# Patient Record
Sex: FEMALE | Race: WHITE | NOT HISPANIC OR LATINO | Employment: OTHER | URBAN - METROPOLITAN AREA
[De-identification: names, ages, dates, MRNs, and addresses within clinical notes are randomized per-mention and may not be internally consistent; named-entity substitution may affect disease eponyms.]

---

## 2017-01-23 ENCOUNTER — TRANSFERRED RECORDS (OUTPATIENT)
Dept: FAMILY MEDICINE | Facility: CLINIC | Age: 67
End: 2017-01-23

## 2017-11-24 ENCOUNTER — TELEPHONE (OUTPATIENT)
Dept: FAMILY MEDICINE | Facility: CLINIC | Age: 67
End: 2017-11-24

## 2017-11-24 ENCOUNTER — OFFICE VISIT (OUTPATIENT)
Dept: FAMILY MEDICINE | Facility: CLINIC | Age: 67
End: 2017-11-24

## 2017-11-24 VITALS
HEIGHT: 67 IN | BODY MASS INDEX: 19.74 KG/M2 | RESPIRATION RATE: 20 BRPM | SYSTOLIC BLOOD PRESSURE: 136 MMHG | HEART RATE: 76 BPM | WEIGHT: 125.8 LBS | TEMPERATURE: 98.3 F | DIASTOLIC BLOOD PRESSURE: 70 MMHG

## 2017-11-24 DIAGNOSIS — Z23 NEED FOR VACCINATION: ICD-10-CM

## 2017-11-24 DIAGNOSIS — C73 THYROID CANCER (H): ICD-10-CM

## 2017-11-24 DIAGNOSIS — E89.0 POSTSURGICAL HYPOTHYROIDISM: ICD-10-CM

## 2017-11-24 DIAGNOSIS — Z00.00 ROUTINE HISTORY AND PHYSICAL EXAMINATION OF ADULT: Primary | ICD-10-CM

## 2017-11-24 DIAGNOSIS — M81.0 OSTEOPOROSIS WITHOUT CURRENT PATHOLOGICAL FRACTURE, UNSPECIFIED OSTEOPOROSIS TYPE: ICD-10-CM

## 2017-11-24 PROCEDURE — 80048 BASIC METABOLIC PNL TOTAL CA: CPT | Mod: 90 | Performed by: FAMILY MEDICINE

## 2017-11-24 PROCEDURE — 90471 IMMUNIZATION ADMIN: CPT | Performed by: FAMILY MEDICINE

## 2017-11-24 PROCEDURE — 99397 PER PM REEVAL EST PAT 65+ YR: CPT | Mod: 25 | Performed by: FAMILY MEDICINE

## 2017-11-24 PROCEDURE — 86803 HEPATITIS C AB TEST: CPT | Mod: 90 | Performed by: FAMILY MEDICINE

## 2017-11-24 PROCEDURE — 90670 PCV13 VACCINE IM: CPT | Performed by: FAMILY MEDICINE

## 2017-11-24 PROCEDURE — 36415 COLL VENOUS BLD VENIPUNCTURE: CPT | Performed by: FAMILY MEDICINE

## 2017-11-24 PROCEDURE — 82306 VITAMIN D 25 HYDROXY: CPT | Mod: 90 | Performed by: FAMILY MEDICINE

## 2017-11-24 RX ORDER — CALCIUM CARBONATE 500 MG/1
2 TABLET, CHEWABLE ORAL DAILY
Qty: 150 TABLET | COMMUNITY
Start: 2017-11-24 | End: 2019-02-27

## 2017-11-24 RX ORDER — LEVOTHYROXINE SODIUM 125 UG/1
125 TABLET ORAL DAILY
Refills: 1 | COMMUNITY
Start: 2017-11-10 | End: 2019-02-27

## 2017-11-24 NOTE — PROGRESS NOTES
Chief Complaint: Olga Hanley is an 67 year old woman who presents for preventive health visit.      Besides routine health maintenance,  she would like to discuss    Complete thyroidectomy.: diagnosed with minimally invasive follicular thyroid cancer: endocrinology monitoring    Health Care Maintenance   Mammogram 1/2017  dexa 3/2016 Femoral neck -1.9  Spine -3.0  Dr Pereyra manages her osteoporosis  Repeat 3/2018  (our machine will be retired- encouraged repeat this month)  Pap 2016: up to date  mammogram 1/2017    Working at [employer] in [place]- part time  Retired from food preparation at [place]    Mother moved to senior apartment- has been busy helping with this transition    She cut back on dose- on Vitamin D  Just taking one tablet daily- 400 IU daily    Healthy Habits:  Do you get at least three servings of calcium containing foods daily (dairy, green leafy vegetables, etc.)? Yes (yogurt daily- also eats almonds, beans, vegetables- diet reviewed)  Outside of work or daily activities, how many days per week do you exercise for 30 minutes or longer? No regular exercise program- not sedentary  Encouraged her to stay as active as possible-  Have you had an eye exam in the past two years? yes  Do you see a dentist twice per year? yes    PHQ-2  Over the last two weeks- Have you been bothered by little interest or pleasure in doing things?  No  Over the last two weeks- Have you been feeling down, depressed, or hopeless?  No      Advanced directive: encouraged    Social History   Substance Use Topics     Smoking status: Former Smoker     Smokeless tobacco: Not on file      Comment: smoking in 20s only     Alcohol use 1.8 oz/week     3 Standard drinks or equivalent per week       Reviewed orders with patient.  Reviewed health maintenance and updated orders accordingly - Yes      History of abnormal Pap smear: NO - age 67 - see link Cervical Cytology Screening Guidelines    All Histories reviewed and updated in  "Epic.      ROS:   C: NEGATIVE for fever, chills, change in weight  I: NEGATIVE for worrisome rashes, moles or lesions  E: NEGATIVE for vision changes or irritation  ENT: NEGATIVE for ear, mouth and throat problems  R: NEGATIVE for significant cough or SOB  B: NEGATIVE for masses, tenderness or discharge  CV: NEGATIVE for chest pain, palpitations or peripheral edema  GI: NEGATIVE for nausea, abdominal pain, heartburn, or change in bowel habits  : NEGATIVE for unusual urinary or vaginal symptoms. No vaginal bleeding.  M: NEGATIVE for significant arthralgias or myalgia  N: NEGATIVE for weakness, dizziness or paresthesias  P: NEGATIVE for changes in mood or affect       OBJECTIVE:/70 (BP Location: Left arm, Patient Position: Chair, Cuff Size: Adult Regular)  Pulse 76  Temp 98.3  F (36.8  C) (Oral)  Resp 20  Ht 1.689 m (5' 6.5\")  Wt 57.1 kg (125 lb 12.8 oz)  Breastfeeding? No  BMI 20 kg/m2  General appearance: Healthy    Skin: Normal. No atypical appearing moles on inspection of trunk and extremities.    External ears  and canals clear bilaterally. TM's normal bilaterally. Nose normal without lesions or discharge. Oropharynx normal. Neck supple without palpable adenopathy.    Breasts are symmetric.  No dominant, discrete, fixed  or suspicious masses are noted.  No skin or nipple changes or axillary nodes.      Regular rate and  rhythm. S1 and S2 normal, no murmurs, clicks, gallops or rubs. No edema or JVD. Chest is clear; no wheezes or rales.    The abdomen is soft without tenderness, guarding, mass or organomegaly. Bowel sounds are normal. No CVA tenderness or inguinal adenopathy noted.    Pelvic: deferred    Rectal exam: deferred    Extremities: negative.      COUNSELING:  Reviewed preventive health counseling, as reflected in patient instructions       Regular exercise/ encouraged walking       Healthy diet/nutrition       Osteoporosis Prevention/Bone Health       Advance Care Planning      ATP III " Guidelines  ICSI Preventive Guidelines    ASSESSMENT/PLAN:  (Z00.00) Routine history and physical examination of adult  (primary encounter diagnosis)  Comment:   Plan: Hepatits C antibody (QUEST), VENOUS COLLECTION,        Vitamin D Deficiency, BASIC METABOLIC PANEL         (QUEST)            (M81.0) Osteoporosis without current pathological fracture, unspecified osteoporosis type  Comment:   Plan: VENOUS COLLECTION, Vitamin D Deficiency, Dexa         hip/pelvis/spine*, BASIC METABOLIC PANEL         (QUEST), calcium carbonate (TUMS) 500 MG         chewable tablet            (Z23) Need for vaccination  Comment:   Plan: VACCINE ADMINISTRATION, INITIAL, PNEUMOCOCCAL         CONJ VACCINE 13 VALENT IM            (E89.0) Postsurgical hypothyroidism  Comment:   Plan: levothyroxine (SYNTHROID/LEVOTHROID) 125 MCG         tablet            (C73) Thyroid cancer (H)  Comment:   Plan: levothyroxine (SYNTHROID/LEVOTHROID) 125 MCG         tablet

## 2017-11-24 NOTE — MR AVS SNAPSHOT
After Visit Summary   11/24/2017    Olga Hanley    MRN: [id number]           Patient Information     Date Of Birth          1950        Visit Information        Provider Department      11/24/2017 11:00 AM Evelyn Kilgore MD [facility]        Today's Diagnoses     Routine history and physical examination of adult    -  1    Osteoporosis without current pathological fracture, unspecified osteoporosis type        Need for vaccination        Postsurgical hypothyroidism        Thyroid cancer (H)           Follow-ups after your visit        Your next 10 appointments already scheduled     Dec 13, 2017  9:00 AM CST   Lab Appointment with [facility]   [facility] ([facility])    [address]  [address]  [facility] [id number] [phone]            Dec 13, 2017  9:30 AM CST   Office Visit with Evelyn Kilgore MD   [facility] ([facility])    [address]  [address]  [facility] [id number] [phone]              Who to contact     If you have questions or need follow up information about today's clinic visit or your schedule please contact [facility] directly at [phone].  Normal or non-critical lab and imaging results will be communicated to you by spigithart, letter or phone within 4 business days after the clinic has received the results. If you do not hear from us within 7 days, please contact the clinic through spigithart or phone. If you have a critical or abnormal lab result, we will notify you by phone as soon as possible.  Submit refill requests through [facility] or call your pharmacy and they will forward the refill request to us. Please allow 3 business days for your refill to be completed.          Additional Information About Your Visit        [facility] Information     [facility] lets you send messages to your doctor, view your  "test results, renew your prescriptions, schedule appointments and more. To sign up, go to [email or web address] . Click on \"Log in\" on the left side of the screen, which will take you to the Welcome page. Then click on \"Sign up Now\" on the right side of the page.     You will be asked to enter the access code listed below, as well as some personal information. Please follow the directions to create your username and password.     Your access code is: [email or web address]  Expires: 2018 11:07 AM     Your access code will  in 90 days. If you need help or a new code, please call your [facility] clinic or [phone].        Care EveryWhere ID     This is your Care EveryWhere ID. This could be used by other organizations to access your [facility] medical records  [id number]        Your Vitals Were     Pulse Temperature Respirations Height Last Period Breastfeeding?    76 98.3  F (36.8  C) (Oral) 20 1.689 m (5' 6.5\") (LMP Unknown) No    BMI (Body Mass Index)                   20 kg/m2            Blood Pressure from Last 3 Encounters:   17 136/70   16 124/68   16 102/64    Weight from Last 3 Encounters:   17 57.1 kg (125 lb 12.8 oz)   16 53.1 kg (117 lb)   16 56.7 kg (125 lb)              We Performed the Following     BASIC METABOLIC PANEL (QUEST)     Hepatits C antibody (QUEST)     PNEUMOCOCCAL CONJ VACCINE 13 VALENT IM     VACCINE ADMINISTRATION, INITIAL     VENOUS COLLECTION     Vitamin D Deficiency          Today's Medication Changes          These changes are accurate as of: 17 11:59 PM.  If you have any questions, ask your nurse or doctor.               These medicines have changed or have updated prescriptions.        Dose/Directions    levothyroxine 125 MCG tablet   Commonly known as:  SYNTHROID/LEVOTHROID   This may have changed:  Another medication with the same name was removed. Continue taking this medication, and follow the directions you see here.   Used for:  " Postsurgical hypothyroidism, Thyroid cancer (H)   Changed by:  Evelyn Kilgore MD        Dose:  125 mcg   Take 125 mcg by mouth daily   Refills:  1                Primary Care Provider Office Phone # Fax #    Evelyn Kilgore [phone]       [address] [facility] [address]  [facility] [address]        Equal Access to Services     [facility]: Hadii aad ku hadasho Soomaali, waaxda luqadaha, qaybta kaalmada adeegyada, waxay idiin hayaan adeeg khasiyash laBrandonaan . So [facility] [phone].    ATENCIÓN: Si habla español, tiene a ware disposición servicios gratuitos de asistencia lingüística. Llame al [phone].    We comply with applicable federal civil rights laws and [place] laws. We do not discriminate on the basis of race, color, national origin, age, disability, sex, sexual orientation, or gender identity.            Thank you!     Thank you for choosing [facility]  for your care. Our goal is always to provide you with excellent care. Hearing back from our patients is one way we can continue to improve our services. Please take a few minutes to complete the written survey that you may receive in the mail after your visit with us. Thank you!             Your Updated Medication List - Protect others around you: Learn how to safely use, store and throw away your medicines at www.disposemymeds.org.          This list is accurate as of: 11/24/17 11:59 PM.  Always use your most recent med list.                   Brand Name Dispense Instructions for use Diagnosis    ALENDRONATE SODIUM PO      Take 70 mg by mouth once a week        calcium carbonate 500 MG chewable tablet    TUMS    150 tablet    Take 2 tablets (1,000 mg) by mouth daily    Osteoporosis without current pathological fracture, unspecified osteoporosis type       levothyroxine 125 MCG tablet    SYNTHROID/LEVOTHROID     Take 125 mcg by mouth daily    Postsurgical hypothyroidism, Thyroid cancer (H)

## 2017-11-24 NOTE — TELEPHONE ENCOUNTER
Patient called and left a msg that she was suppose to call [facility] back with the amount of Vit D she is taking - 400IU qd    [phone] (home)

## 2017-11-24 NOTE — NURSING NOTE
Olga Hanley is here for a CPX.    Pre-visit planning  Immunizations -up to date  Colonoscopy -is due and to be scheduled by patient for later completion  Mammogram -is up to date  Asthma test --  PHQ9 -  ELOY 7 -  Questioned patient about current smoking habits.  Pt. quit smoking some time ago.  Body mass index is 20 kg/(m^2).  PULSE regular  My Chart:   CLASSIFICATION OF OVERWEIGHT AND OBESITY BY BMI                        Obesity Class           BMI(kg/m2)  Underweight                                    < 18.5  Normal                                         18.5-24.9  Overweight                                     25.0-29.9  OBESITY                     I                  30.0-34.9                             II                 35.0-39.9  EXTREME OBESITY             III                >40                            Patient's  BMI Body mass index is 20 kg/(m^2).  Http://hin.nhlbi.nih.gov/menuplanner/menu.cgi  ETOH screening:  Questions:  1-How often do you have a drink containing alcohol?                             2 times per week(s)  2-How many drinks containing alcohol do you have on a typical day when you are         Drinking?                              2   3- How often do you have 5 or more drinks on one occasion?                              never per     Have you ever:  None of the patient's responses to the CAGE screening were positive / Negative CAGE score

## 2017-11-24 NOTE — LETTER
December 3, 2017      Olga Hanley  [address] [place] [address]   [facility] [address]        Dear MsNaliniTalon,    We are writing to inform you of your test results.    Normal (negative) Hepatitis C screen    Your vitamin d level was Normal. The recommended daily dose of Vitamin D3 is 1000 IU .    Your recent basic profile was Normal including blood sugar, potassium and kidney function tests      Resulted Orders   Hepatits C antibody (QUEST)   Result Value Ref Range    HCV Antibody NON-REACTIVE NON-REACTIVE    SIGNAL TO CUT OFF - QUEST 0.01 <1.00   Vitamin D Deficiency   Result Value Ref Range    Vitamin D 25-OH Total 43 30 - 100 ng/mL      Comment:      Vitamin D Status         25-OH Vitamin D:     Deficiency:                    <20 ng/mL  Insufficiency:             20 - 29 ng/mL  Optimal:                 > or = 30 ng/mL     For 25-OH Vitamin D testing on patients on   D2-supplementation and patients for whom quantitation   of D2 and D3 fractions is required, the QuestAssureD(TM)  25-OH VIT D, (D2,D3), LC/MS/MS is recommended: order   code 19126 (patients >2yrs).     For more information on this test, go to:  http://education.[employer]/faq/RZX296  (This link is being provided for   informational/educational purposes only.)     BASIC METABOLIC PANEL (QUEST)   Result Value Ref Range    Glucose 86 65 - 99 mg/dL      Comment:                    Fasting reference interval         Urea Nitrogen 11 7 - 25 mg/dL    Creatinine 0.66 0.50 - 0.99 mg/dL      Comment:      For patients >49 years of age, the reference limit  for Creatinine is approximately 13% higher for people  identified as -American.         GFR Estimate 93 > OR = 60 mL/min/1.73m2    EGFR African American 107 > OR = 60 mL/min/1.73m2    BUN/Creatinine Ratio NOT APPLICABLE 6 - 22 (calc)    Sodium 139 135 - 146 mmol/L    Potassium 4.5 3.5 - 5.3 mmol/L    Chloride 100 98 - 110 mmol/L    Carbon Dioxide 20 20 - 31 mmol/L    Calcium 10.2 8.6 - 10.4  mg/dL       If you have any questions or concerns, please call the clinic at the number listed above.       Sincerely,        Evelyn Kilgore MD

## 2017-11-25 LAB
BUN SERPL-MCNC: 11 MG/DL (ref 7–25)
BUN/CREATININE RATIO: NORMAL (CALC) (ref 6–22)
CALCIUM SERPL-MCNC: 10.2 MG/DL (ref 8.6–10.4)
CHLORIDE SERPLBLD-SCNC: 100 MMOL/L (ref 98–110)
CO2 SERPL-SCNC: 20 MMOL/L (ref 20–31)
CREAT SERPL-MCNC: 0.66 MG/DL (ref 0.5–0.99)
EGFR AFRICAN AMERICAN - QUEST: 107 ML/MIN/1.73M2
GFR SERPL CREATININE-BSD FRML MDRD: 93 ML/MIN/1.73M2
GLUCOSE - QUEST: 86 MG/DL (ref 65–99)
HCV AB - QUEST: NORMAL
POTASSIUM SERPL-SCNC: 4.5 MMOL/L (ref 3.5–5.3)
SIGNAL TO CUT OFF - QUEST: 0.01
SODIUM SERPL-SCNC: 139 MMOL/L (ref 135–146)
VITAMIN D, 25-OH, TOTAL - QUEST: 43 NG/ML (ref 30–100)

## 2017-12-13 ENCOUNTER — TELEPHONE (OUTPATIENT)
Dept: FAMILY MEDICINE | Facility: CLINIC | Age: 67
End: 2017-12-13

## 2017-12-13 DIAGNOSIS — M81.0 OSTEOPOROSIS WITHOUT CURRENT PATHOLOGICAL FRACTURE, UNSPECIFIED OSTEOPOROSIS TYPE: Primary | ICD-10-CM

## 2017-12-13 NOTE — TELEPHONE ENCOUNTER
Pt called to cancel her Dexa today. She is ill.  Pt aware that you are gone next week.  Pt states that she will go to [facility] to have the Dexa done.   Will you please put in a order to be sent to [facility].  Pt states that she will follow up with you after she had is dexa  Pt also wishes you francisca Wilson.  Theresa  [phone] (home)   [phone] (work)

## 2018-01-18 ENCOUNTER — OFFICE VISIT (OUTPATIENT)
Dept: FAMILY MEDICINE | Facility: CLINIC | Age: 68
End: 2018-01-18

## 2018-01-18 VITALS
WEIGHT: 126.8 LBS | DIASTOLIC BLOOD PRESSURE: 78 MMHG | HEART RATE: 101 BPM | BODY MASS INDEX: 20.16 KG/M2 | TEMPERATURE: 98.4 F | OXYGEN SATURATION: 98 % | SYSTOLIC BLOOD PRESSURE: 126 MMHG

## 2018-01-18 DIAGNOSIS — H61.23 BILATERAL IMPACTED CERUMEN: Primary | ICD-10-CM

## 2018-01-18 DIAGNOSIS — M81.0 OSTEOPOROSIS WITHOUT CURRENT PATHOLOGICAL FRACTURE, UNSPECIFIED OSTEOPOROSIS TYPE: ICD-10-CM

## 2018-01-18 DIAGNOSIS — Z00.00 ROUTINE HISTORY AND PHYSICAL EXAMINATION OF ADULT: ICD-10-CM

## 2018-01-18 PROCEDURE — 99213 OFFICE O/P EST LOW 20 MIN: CPT | Performed by: PHYSICIAN ASSISTANT

## 2018-01-18 NOTE — NURSING NOTE
Olga is here for plugged ear on the right    Pre-visit Screening:  Immunizations:  up to date  Colonoscopy:  is up to date  Mammogram: is up to date  Asthma Action Test/Plan:  NA  PHQ9:  NA  GAD7:  NA  Questioned patient about current smoking habits Pt. has never smoked.  Ok to leave detailed message on voice mail for today's visit only Yes, phone # [phone]

## 2018-01-18 NOTE — MR AVS SNAPSHOT
"              After Visit Summary   2018    Olga Hanley    MRN: [id number]           Patient Information     Date Of Birth          1950        Visit Information        Provider Department      2018 1:45 PM Lauren Bose PA-C [facility]        Today's Diagnoses     Bilateral impacted cerumen    -  1       Follow-ups after your visit        Follow-up notes from your care team     Return if symptoms worsen or fail to improve.      Who to contact     If you have questions or need follow up information about today's clinic visit or your schedule please contact [facility] directly at [phone].  Normal or non-critical lab and imaging results will be communicated to you by MyChart, letter or phone within 4 business days after the clinic has received the results. If you do not hear from us within 7 days, please contact the clinic through MyChart or phone. If you have a critical or abnormal lab result, we will notify you by phone as soon as possible.  Submit refill requests through [facility] or call your pharmacy and they will forward the refill request to us. Please allow 3 business days for your refill to be completed.          Additional Information About Your Visit        MyChart Information     [facility] lets you send messages to your doctor, view your test results, renew your prescriptions, schedule appointments and more. To sign up, go to [email or web address] . Click on \"Log in\" on the left side of the screen, which will take you to the Welcome page. Then click on \"Sign up Now\" on the right side of the page.     You will be asked to enter the access code listed below, as well as some personal information. Please follow the directions to create your username and password.     Your access code is: [email or web address]  Expires: 2018 11:07 AM     Your access code will  in 90 days. If you need help or a new code, please call your [place] " [facility] or [phone].        Care EveryWhere ID     This is your Care EveryWhere ID. This could be used by other organizations to access your [facility] medical records  [id number]        Your Vitals Were     Pulse Temperature Last Period Pulse Oximetry BMI (Body Mass Index)       101 98.4  F (36.9  C) (Oral) (LMP Unknown) 98% 20.16 kg/m2        Blood Pressure from Last 3 Encounters:   01/18/18 126/78   11/24/17 136/70   09/21/16 124/68    Weight from Last 3 Encounters:   01/18/18 57.5 kg (126 lb 12.8 oz)   11/24/17 57.1 kg (125 lb 12.8 oz)   09/20/16 53.1 kg (117 lb)              We Performed the Following     Removal Impacted Cerumen Irrigation Unilateral (Ear Wash)        Primary Care Provider Office Phone # Fax #    Evelyn Kilgore -[phone] [address] [address]        Equal Access to Services     [facility] : Hadii nader ku hadasho Soomaali, waaxda luqadaha, qaybta kaalmada adeegyada, waxay eduardo hayarsalan rowe . So [facility] [phone].    ATENCIÓN: Si habla español, tiene a ware disposición servicios gratuitos de asistencia lingüística. Llame al [phone].    We comply with applicable federal civil rights laws and [place] laws. We do not discriminate on the basis of race, color, national origin, age, disability, sex, sexual orientation, or gender identity.            Thank you!     Thank you for choosing [facility]  for your care. Our goal is always to provide you with excellent care. Hearing back from our patients is one way we can continue to improve our services. Please take a few minutes to complete the written survey that you may receive in the mail after your visit with us. Thank you!             Your Updated Medication List - Protect others around you: Learn how to safely use, store and throw away your medicines at www.disposemymeds.org.          This list is accurate as of: 1/18/18 11:46 PM.  Always use  your most recent med list.                   Brand Name Dispense Instructions for use Diagnosis    ALENDRONATE SODIUM PO      Take 70 mg by mouth once a week        calcium carbonate 500 MG chewable tablet    TUMS    150 tablet    Take 2 tablets (1,000 mg) by mouth daily    Osteoporosis without current pathological fracture, unspecified osteoporosis type       levothyroxine 125 MCG tablet    SYNTHROID/LEVOTHROID     Take 125 mcg by mouth daily    Postsurgical hypothyroidism, Thyroid cancer (H)

## 2018-01-18 NOTE — PROGRESS NOTES
"CC: R ear plugged    History:  Olga is here as her R ear feels plugged for the past several weeks. L ear is completely fine. Today noticed her hearing was worse, where it hadn't been noticeable before. Has required ear flush in the past. Does not use Q-tips. No dizziness, vertigo.     Tried doing a peroxide wash recently, without success.      PMH, MEDICATIONS, ALLERGIES, SOCIAL AND FAMILY HISTORY in [facility] and reviewed by me personally.      ROS negative other than the symptoms noted above in the HPI.        Examination   /78 (BP Location: Right arm, Patient Position: Chair, Cuff Size: Adult Regular)  Pulse 101  Temp 98.4  F (36.9  C) (Oral)  Wt 57.5 kg (126 lb 12.8 oz)  LMP  (LMP Unknown)  SpO2 98%  BMI 20.16 kg/m2       Constitutional: Sitting comfortably, in no acute distress. Vital signs noted  Ears: external canals and TMs free of abnormalities  Cardiovascular:  regular rate and rhythm, no murmurs, clicks, or gallops  Respiratory:  normal respiratory rate and rhythm, lungs clear to auscultation  SKIN: No jaundice/pallor/rash.   Psychiatric: mentation appears normal and affect normal/bright        A/P    ICD-10-CM    1. Bilateral impacted cerumen H61.23 Removal Impacted Cerumen Irrigation Unilateral (Ear Wash)       DISCUSSION: Olga's cerumen was successfully removed, and pt felt instant relief of symptoms. Gave handout on \"do's and don'ts\" of wax removal. Recommended intermittent softening drops. Contact us with any concerns.     follow up visit: As needed    Lauren Bose PA-C  [facility]    "

## 2018-01-25 LAB — MAMMOGRAM: NORMAL

## 2018-02-12 ENCOUNTER — OFFICE VISIT (OUTPATIENT)
Dept: FAMILY MEDICINE | Facility: CLINIC | Age: 68
End: 2018-02-12

## 2018-02-12 VITALS
HEART RATE: 79 BPM | TEMPERATURE: 98 F | BODY MASS INDEX: 20.25 KG/M2 | SYSTOLIC BLOOD PRESSURE: 122 MMHG | WEIGHT: 129 LBS | DIASTOLIC BLOOD PRESSURE: 60 MMHG | HEIGHT: 67 IN | OXYGEN SATURATION: 99 %

## 2018-02-12 DIAGNOSIS — M81.0 OSTEOPOROSIS WITHOUT CURRENT PATHOLOGICAL FRACTURE, UNSPECIFIED OSTEOPOROSIS TYPE: Primary | ICD-10-CM

## 2018-02-12 PROCEDURE — 99213 OFFICE O/P EST LOW 20 MIN: CPT | Performed by: FAMILY MEDICINE

## 2018-02-12 NOTE — PROGRESS NOTES
SUBJECTIVE:   Olga Hanley is a 67 year old female who presents to clinic today for the following health issues:    The patient presents to discuss the results of her recent DEXA scan, please see below. She has a history of osteoporosis. She is currently taking alendronate once per week, starting one year ago. She does not experience any side effects of heartburn with this. She has also been taking a calcium with Vitamin D supplement, per her endocrinologist, but has had some problems with constipation. She also takes a daily vitamin D supplement.    DEXA SCAN results: 1/25/2018    The total T-score from L1 to L4 is -2.9.  The T-score for the right femoral neck is -1.8. The bone mineral density of the right femoral neck is 0.647 g/cm2.  The T-score for the left femoral neck is -2.2.    Comparison to 3/2016  Left femoral neck -1.9 (improved from -2.2)  Spine -3.0 - unchanged    Osteoporosis Risk Score/FRAX score:  http://www.shef.ac.uk/FRAX/  Risk of Hip Fracture 1.9(Significant if >3%)  Risk of Overall Fracture: 10 (Significant if >20%)    Problem list and histories reviewed & adjusted, as indicated.  Additional history: as documented    Patient Active Problem List   Diagnosis     ACP (advance care planning)     Health Care Home     Osteoporosis     Thyroid cancer (H)     Past Surgical History:   Procedure Laterality Date     C APPENDECTOMY  1998    incidental with hysterectomy     C NONSPECIFIC PROCEDURE  3/2003    cystocele, rectocele, repair Dr Escobar JOYA TOTAL ABDOM HYSTERECTOMY  1988    left oophorectomy; abnormal pap smears     CATARACT IOL, RT/LT  4/2011    left     COLONOSCOPY  2/9/2006    no immediate complication, diverticulitis-sigmoid/ Dr Sameer Pearl     THYROIDECTOMY Right 8/23/2016    Procedure: THYROIDECTOMY;  Surgeon: Prudence Broussard MD;  Location: [facility] OR     THYROIDECTOMY Left 9/20/2016    Procedure: THYROIDECTOMY;  Surgeon: Prudence Broussard MD;  Location:  OR       Social History  "  Substance Use Topics     Smoking status: Former Smoker     Smokeless tobacco: Never Used      Comment: smoking in 20s only     Alcohol use 1.8 oz/week     3 Standard drinks or equivalent per week     Family History   Problem Relation Age of Onset     Lipids Mother      diagnosed in her 70's     CANCER Maternal Aunt 70     breast cancer     Other - See Comments Brother      Turner  at 10         Current Outpatient Prescriptions   Medication Sig Dispense Refill     Alendronate Sodium 70 MG TBEF Take 70 mg by mouth once a week 12 tablet 3     levothyroxine (SYNTHROID/LEVOTHROID) 125 MCG tablet Take 125 mcg by mouth daily  1     calcium carbonate (TUMS) 500 MG chewable tablet Take 2 tablets (1,000 mg) by mouth daily 150 tablet      [DISCONTINUED] ALENDRONATE SODIUM PO Take 70 mg by mouth once a week         Reviewed and updated as needed this visit by clinical staff  Tobacco  Allergies  Meds  Problems       Reviewed and updated as needed this visit by Provider           OBJECTIVE:     /60 (BP Location: Right arm, Patient Position: Chair, Cuff Size: Adult Regular)  Pulse 79  Temp 98  F (36.7  C) (Oral)  Ht 1.689 m (5' 6.5\")  Wt 58.5 kg (129 lb)  LMP  (LMP Unknown)  SpO2 99%  Breastfeeding? No  BMI 20.51 kg/m2  Body mass index is 20.51 kg/(m^2).   Constitutional: Alert, oriented, well hydrated. Skin turgor normal.    Diagnostic Test Results:  none    ASSESSMENT/PLAN:     Problem List Items Addressed This Visit     Osteoporosis - Primary    Relevant Medications    Alendronate Sodium 70 MG TBEF         (M81.0) Osteoporosis without current pathological fracture, unspecified osteoporosis type  (primary encounter diagnosis)  Comment: Recent DEXA scan indicates stable osteoporosis. Will continue alendronate. Patient also advised to discontinue calcium supplement and replace this with calcium rich foods in her diet.- continue her Vitamin D supplement.   Plan: Alendronate Sodium 70 MG TBEF    Repeat " DEXA in 2 years    More than 50% of visit spent in counseling.              Scribe Statement: I, Gil Choudhury, PSS, am scribing for and in the presence of Evelyn Kilgore MD. 2/12/2018 1:38 PM    Provider Statement: I personally performed this service and the scribe documentation above accurately reflects this service. Evelyn Kilgore MD 2/12/2018 1:38 PM    Evelyn Kilgore MD  [facility]

## 2018-02-12 NOTE — MR AVS SNAPSHOT
"              After Visit Summary   2018    Olga Hanley    MRN: [id number]           Patient Information     Date Of Birth          1950        Visit Information        Provider Department      2018 2:30 PM Evelyn Kilgore MD [facility]        Care Instructions    Three dairy products a day   (other sources of calcium almonds, red and white beans, kale)    Take 1000 IU of vitamin D3    Stop calcium supplements          Follow-ups after your visit        Who to contact     If you have questions or need follow up information about today's clinic visit or your schedule please contact [facility] directly at [phone].  Normal or non-critical lab and imaging results will be communicated to you by MyChart, letter or phone within 4 business days after the clinic has received the results. If you do not hear from us within 7 days, please contact the clinic through MyChart or phone. If you have a critical or abnormal lab result, we will notify you by phone as soon as possible.  Submit refill requests through Dot or call your pharmacy and they will forward the refill request to us. Please allow 3 business days for your refill to be completed.          Additional Information About Your Visit        MyChart Information     Dot lets you send messages to your doctor, view your test results, renew your prescriptions, schedule appointments and more. To sign up, go to [email or web address] . Click on \"Log in\" on the left side of the screen, which will take you to the Welcome page. Then click on \"Sign up Now\" on the right side of the page.     You will be asked to enter the access code listed below, as well as some personal information. Please follow the directions to create your username and password.     Your access code is: [email or web address]  Expires: 2018 11:07 AM     Your access code will  in 90 days. If you need help or a new code, please " "call your [place] clinic or [phone].        Care EveryWhere ID     This is your Care EveryWhere ID. This could be used by other organizations to access your [place] medical records  [id number]        Your Vitals Were     Pulse Temperature Height Last Period Pulse Oximetry Breastfeeding?    79 98  F (36.7  C) (Oral) 1.689 m (5' 6.5\") (LMP Unknown) 99% No    BMI (Body Mass Index)                   20.51 kg/m2            Blood Pressure from Last 3 Encounters:   02/12/18 122/60   01/18/18 126/78   11/24/17 136/70    Weight from Last 3 Encounters:   02/12/18 58.5 kg (129 lb)   01/18/18 57.5 kg (126 lb 12.8 oz)   11/24/17 57.1 kg (125 lb 12.8 oz)              Today, you had the following     No orders found for display       Primary Care Provider Office Phone # Fax #    Evelyn Kilgore [phone]       [address] [address]        Equal Access to Services     [facility]: Hadii nader ku hadasho Soomaali, waaxda luqadaha, qaybta kaalmada adekeenan, sommer rowe . So [facility] [phone].    ATENCIÓN: Si habla español, tiene a ware disposición servicios gratuitos de asistencia lingüística. [facility] [phone].    We comply with applicable federal civil rights laws and [place] laws. We do not discriminate on the basis of race, color, national origin, age, disability, sex, sexual orientation, or gender identity.            Thank you!     Thank you for choosing [facility]  for your care. Our goal is always to provide you with excellent care. Hearing back from our patients is one way we can continue to improve our services. Please take a few minutes to complete the written survey that you may receive in the mail after your visit with us. Thank you!             Your Updated Medication List - Protect others around you: Learn how to safely use, store and throw away your medicines at www.disposemymeds.org.          This list " is accurate as of 2/12/18  2:38 PM.  Always use your most recent med list.                   Brand Name Dispense Instructions for use Diagnosis    ALENDRONATE SODIUM PO      Take 70 mg by mouth once a week        calcium carbonate 500 MG chewable tablet    TUMS    150 tablet    Take 2 tablets (1,000 mg) by mouth daily    Osteoporosis without current pathological fracture, unspecified osteoporosis type       levothyroxine 125 MCG tablet    SYNTHROID/LEVOTHROID     Take 125 mcg by mouth daily    Postsurgical hypothyroidism, Thyroid cancer (H)

## 2018-02-12 NOTE — NURSING NOTE
Olga is here for dexa results    Pre-Visit Screening :  Immunizations : up to date    Colonoscopy : is due and to be scheduled by patient for later completion  Mammogram : is up to date  Asthma Action Test/Plan : jada  PHQ9/GAD7 :  Na    Pulse - regular  My Chart - declines    CLASSIFICATION OF OVERWEIGHT AND OBESITY BY BMI                         Obesity Class           BMI(kg/m2)  Underweight                                    < 18.5  Normal                                         18.5-24.9  Overweight                                     25.0-29.9  OBESITY                     I                  30.0-34.9                              II                 35.0-39.9  EXTREME OBESITY             III                >40                             Patient's  BMI Body mass index is 22.15 kg/(m^2).  http://hin.nhlbi.nih.gov/menuplanner/menu.cgi  Questioned patient about current smoking habits.  Pt. has never smoked.  The patient has verbalized that it is ok to leave a detailed voice message on the patient's cell phone with results/recommendations from this visit.       Verified [phone] phone number:

## 2018-02-28 ENCOUNTER — HOSPITAL ENCOUNTER (OUTPATIENT)
Dept: ULTRASOUND IMAGING | Facility: CLINIC | Age: 68
Discharge: HOME | End: 2018-02-28
Attending: SPECIALIST | Admitting: SPECIALIST
Payer: COMMERCIAL

## 2018-02-28 DIAGNOSIS — Z85.850 HISTORY OF THYROID CANCER: ICD-10-CM

## 2018-02-28 PROCEDURE — 76536 US EXAM OF HEAD AND NECK: CPT

## 2018-10-15 ENCOUNTER — ALLIED HEALTH/NURSE VISIT (OUTPATIENT)
Dept: FAMILY MEDICINE | Facility: CLINIC | Age: 68
End: 2018-10-15

## 2018-10-15 DIAGNOSIS — Z23 NEED FOR VACCINATION: Primary | ICD-10-CM

## 2018-10-15 PROCEDURE — 90686 IIV4 VACC NO PRSV 0.5 ML IM: CPT | Performed by: FAMILY MEDICINE

## 2018-10-15 PROCEDURE — 90471 IMMUNIZATION ADMIN: CPT | Performed by: FAMILY MEDICINE

## 2018-10-15 PROCEDURE — 90714 TD VACC NO PRESV 7 YRS+ IM: CPT | Performed by: FAMILY MEDICINE

## 2018-10-15 PROCEDURE — 90472 IMMUNIZATION ADMIN EACH ADD: CPT | Performed by: FAMILY MEDICINE

## 2018-11-08 ENCOUNTER — OFFICE VISIT (OUTPATIENT)
Dept: FAMILY MEDICINE | Facility: CLINIC | Age: 68
End: 2018-11-08

## 2018-11-08 VITALS
SYSTOLIC BLOOD PRESSURE: 118 MMHG | WEIGHT: 139 LBS | OXYGEN SATURATION: 97 % | DIASTOLIC BLOOD PRESSURE: 70 MMHG | TEMPERATURE: 98.1 F | BODY MASS INDEX: 22.1 KG/M2 | HEART RATE: 93 BPM

## 2018-11-08 DIAGNOSIS — B02.9 HERPES ZOSTER WITHOUT COMPLICATION: Primary | ICD-10-CM

## 2018-11-08 PROCEDURE — 99213 OFFICE O/P EST LOW 20 MIN: CPT | Performed by: FAMILY MEDICINE

## 2018-11-08 RX ORDER — VALACYCLOVIR HYDROCHLORIDE 1 G/1
1000 TABLET, FILM COATED ORAL 3 TIMES DAILY
Qty: 21 TABLET | Refills: 0 | Status: SHIPPED | OUTPATIENT
Start: 2018-11-08 | End: 2019-02-27

## 2018-11-08 NOTE — MR AVS SNAPSHOT
After Visit Summary   11/8/2018    Olga Hanley    MRN: [id number]           Patient Information     Date Of Birth          1950        Visit Information        Provider Department      11/8/2018 11:30 AM Evelyn Kilgore MD [facility]        Today's Diagnoses     Herpes zoster without complication    -  1       Follow-ups after your visit        Who to contact     If you have questions or need follow up information about today's clinic visit or your schedule please contact [facility] directly at [phone].  Normal or non-critical lab and imaging results will be communicated to you by MyChart, letter or phone within 4 business days after the clinic has received the results. If you do not hear from us within 7 days, please contact the clinic through MyChart or phone. If you have a critical or abnormal lab result, we will notify you by phone as soon as possible.  Submit refill requests through WorldAPP or call your pharmacy and they will forward the refill request to us. Please allow 3 business days for your refill to be completed.          Additional Information About Your Visit        Care EveryWhere ID     This is your Care EveryWhere ID. This could be used by other organizations to access your [place] medical records  [id number]        Your Vitals Were     Pulse Temperature Last Period Pulse Oximetry BMI (Body Mass Index)       93 98.1  F (36.7  C) (Oral) (LMP Unknown) 97% 22.1 kg/m2        Blood Pressure from Last 3 Encounters:   11/08/18 118/70   02/12/18 122/60   01/18/18 126/78    Weight from Last 3 Encounters:   11/08/18 63 kg (139 lb)   02/12/18 58.5 kg (129 lb)   01/18/18 57.5 kg (126 lb 12.8 oz)              Today, you had the following     No orders found for display         Today's Medication Changes          These changes are accurate as of 11/8/18 11:59 PM.  If you have any questions, ask your nurse or doctor.                Start taking these medicines.        Dose/Directions    valACYclovir 1000 mg tablet   Commonly known as:  VALTREX   Used for:  Herpes zoster without complication   Started by:  Evelyn Kilgore MD        Dose:  1000 mg   Take 1 tablet (1,000 mg) by mouth 3 times daily   Quantity:  21 tablet   Refills:  0            Where to get your medicines      These medications were sent to [id number] - [place], [place] - [address] [facility] [address]  [address], [facility] [address]     Phone:  [phone]     valACYclovir 1000 mg tablet                Primary Care Provider Office Phone # Fax #    Evelyn Kilgore [phone] [address] [address]        Equal Access to Services     [facility] : Hadii nader au hadasho Soomaali, waaxda luqadaha, qaybta kaalmada adeegyada, sommer rowe . So [facility] [phone].    ATENCIÓN: Si habla español, tiene a ware disposición servicios gratuitos de asistencia lingüística. Llame al [phone].    We comply with applicable federal civil rights laws and [place] laws. We do not discriminate on the basis of race, color, national origin, age, disability, sex, sexual orientation, or gender identity.            Thank you!     Thank you for choosing [facility]  for your care. Our goal is always to provide you with excellent care. Hearing back from our patients is one way we can continue to improve our services. Please take a few minutes to complete the written survey that you may receive in the mail after your visit with us. Thank you!             Your Updated Medication List - Protect others around you: Learn how to safely use, store and throw away your medicines at www.disposemymeds.org.          This list is accurate as of 11/8/18 11:59 PM.  Always use your most recent med list.                   Brand Name Dispense Instructions for  use Diagnosis    Alendronate Sodium 70 MG Tbef     12 tablet    Take 70 mg by mouth once a week    Osteoporosis without current pathological fracture, unspecified osteoporosis type       calcium carbonate 500 MG chewable tablet    TUMS    150 tablet    Take 2 tablets (1,000 mg) by mouth daily    Osteoporosis without current pathological fracture, unspecified osteoporosis type       levothyroxine 125 MCG tablet    SYNTHROID/LEVOTHROID     Take 125 mcg by mouth daily    Postsurgical hypothyroidism, Thyroid cancer (H)       valACYclovir 1000 mg tablet    VALTREX    21 tablet    Take 1 tablet (1,000 mg) by mouth 3 times daily    Herpes zoster without complication

## 2018-11-08 NOTE — NURSING NOTE
Olga is here due to having a rash on her right leg that seems to be spreading.      Pre-visit Screening:  Immunizations:  up to date  Colonoscopy:  is due and to be scheduled by patient for later completion  Mammogram: is up to date  Asthma Action Test/Plan:  NA  PHQ9:  PHQ-2 done today   GAD7:  No concerns  Questioned patient about current smoking habits Pt. quit smoking some time ago.  Ok to leave detailed message on voice mail for today's visit only Yes, phone # [phone]

## 2018-11-08 NOTE — PROGRESS NOTES
SUBJECTIVE:  68 year old female presents with the following concern:  Two day history of rash on her right leg-   She first noticed the rash two days ago behind her right knee.  Now another cluster is more proximal , posterior upper leg.  She feels a soreness in her buttock, but no rash  She is having more stress- cares for her aging mother as well has her  who is recovering from a recent colon resection.      Patient Active Problem List   Diagnosis     ACP (advance care planning)     Health Care Home     Osteoporosis     Thyroid cancer (H)     Past Surgical History:   Procedure Laterality Date     C APPENDECTOMY  1998    incidental with hysterectomy     C NONSPECIFIC PROCEDURE  3/2003    cystocele, rectocele, repair Dr Escobar JOYA TOTAL ABDOM HYSTERECTOMY  1988    left oophorectomy; abnormal pap smears     CATARACT IOL, RT/LT  4/2011    left     COLONOSCOPY  2/9/2006    no immediate complication, diverticulitis-sigmoid/ Dr Sameer Pearl     THYROIDECTOMY Right 8/23/2016    Procedure: THYROIDECTOMY;  Surgeon: Prudence Broussard MD;  Location: [facility] OR     THYROIDECTOMY Left 9/20/2016    Procedure: THYROIDECTOMY;  Surgeon: Prudence Broussard MD;  Location: [facility] OR     Current Outpatient Prescriptions   Medication     Alendronate Sodium 70 MG TBEF     calcium carbonate (TUMS) 500 MG chewable tablet     levothyroxine (SYNTHROID/LEVOTHROID) 125 MCG tablet     valACYclovir (VALTREX) 1000 mg tablet     No current facility-administered medications for this visit.            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

## 2018-12-05 ENCOUNTER — ALLIED HEALTH/NURSE VISIT (OUTPATIENT)
Dept: FAMILY MEDICINE | Facility: CLINIC | Age: 68
End: 2018-12-05

## 2018-12-05 DIAGNOSIS — Z23 NEED FOR VACCINATION: Primary | ICD-10-CM

## 2018-12-05 PROCEDURE — 90471 IMMUNIZATION ADMIN: CPT | Performed by: FAMILY MEDICINE

## 2018-12-05 PROCEDURE — 90732 PPSV23 VACC 2 YRS+ SUBQ/IM: CPT | Performed by: FAMILY MEDICINE

## 2019-01-05 DIAGNOSIS — M81.0 OSTEOPOROSIS WITHOUT CURRENT PATHOLOGICAL FRACTURE, UNSPECIFIED OSTEOPOROSIS TYPE: ICD-10-CM

## 2019-01-05 RX ORDER — ALENDRONATE SODIUM 70 MG/1
TABLET, EFFERVESCENT ORAL
Qty: 12 TABLET | Refills: 0 | OUTPATIENT
Start: 2019-01-05

## 2019-01-05 NOTE — TELEPHONE ENCOUNTER
Received incoming refill request for   Pending Prescriptions:                       Disp   Refills    BINOSTO 70 MG TBEF [Pharmacy Med Name: BI*12 tab*0            Sig: DISSOLVE AND DRINK 1 TABLET BY MOUTH 1 TIME A           WEEK    This medication is not currently on patients medication list. She was last seen in clinic on 11/8/18 for herpes outbreak. Routing to Dr. Kilgore to clarify if patient should be taking this or not.

## 2019-01-08 DIAGNOSIS — M81.0 OSTEOPOROSIS WITHOUT CURRENT PATHOLOGICAL FRACTURE, UNSPECIFIED OSTEOPOROSIS TYPE: ICD-10-CM

## 2019-01-08 RX ORDER — ALENDRONATE SODIUM 70 MG/1
TABLET, EFFERVESCENT ORAL
Qty: 12 TABLET | OUTPATIENT
Start: 2019-01-08

## 2019-01-08 NOTE — TELEPHONE ENCOUNTER
Refused Prescriptions:                       Disp   Refills    BINOSTO 70 MG TBEF [Pharmacy Med Name: BIN*12 tab*         Sig: DISSOLVE AND DRINK 1 TABLET BY MOUTH 1 TIME A WEEK  Refused By: ANN LOCKHART  Reason for Refusal: Originating/Specialty Provider to approve  Reason for Refusal Comment: Dr Pereyra, [facility]    See RE on 1-5-2019  Ann  [phone] (home)

## 2019-01-10 DIAGNOSIS — M81.0 OSTEOPOROSIS WITHOUT CURRENT PATHOLOGICAL FRACTURE, UNSPECIFIED OSTEOPOROSIS TYPE: ICD-10-CM

## 2019-01-11 RX ORDER — ALENDRONATE SODIUM 70 MG/1
TABLET, EFFERVESCENT ORAL
Qty: 12 TABLET | Refills: 0 | OUTPATIENT
Start: 2019-01-11

## 2019-01-11 NOTE — TELEPHONE ENCOUNTER
Pending Prescriptions:                       Disp   Refills    BINOSTO 70 MG TBEF [Pharmacy Med Name: BI*12 tab*0            Sig: DISSOLVE AND DRINK 1 TABLET BY MOUTH 1 TIME A           WEEK    RENALDO waite review    I refused the last one due to I saw your RE on 1-5-2018  I did inform pharmacy that Endo is refilling  Please deny  Ann  [phone] (home)

## 2019-01-11 NOTE — TELEPHONE ENCOUNTER
Spoke with pt stated this has never been filled here and needs to be filled through her endo. Pt stated it was always filled here, but she will call endo to ask.

## 2019-02-11 ENCOUNTER — TRANSFERRED RECORDS (OUTPATIENT)
Dept: FAMILY MEDICINE | Facility: CLINIC | Age: 69
End: 2019-02-11

## 2019-02-27 ENCOUNTER — OFFICE VISIT (OUTPATIENT)
Dept: FAMILY MEDICINE | Facility: CLINIC | Age: 69
End: 2019-02-27

## 2019-02-27 VITALS
SYSTOLIC BLOOD PRESSURE: 118 MMHG | WEIGHT: 141.6 LBS | RESPIRATION RATE: 20 BRPM | TEMPERATURE: 98.3 F | HEART RATE: 76 BPM | DIASTOLIC BLOOD PRESSURE: 68 MMHG | HEIGHT: 66 IN | BODY MASS INDEX: 22.76 KG/M2

## 2019-02-27 DIAGNOSIS — H26.9 CATARACT OF RIGHT EYE, UNSPECIFIED CATARACT TYPE: ICD-10-CM

## 2019-02-27 DIAGNOSIS — Z01.818 PRE-OP EXAM: Primary | ICD-10-CM

## 2019-02-27 LAB
ERYTHROCYTE [DISTWIDTH] IN BLOOD BY AUTOMATED COUNT: 12.4 %
HCT VFR BLD AUTO: 42.8 % (ref 35–47)
HEMOGLOBIN: 14 G/DL (ref 11.7–15.7)
MCH RBC QN AUTO: 29.7 PG (ref 26–33)
MCHC RBC AUTO-ENTMCNC: 32.7 G/DL (ref 31–36)
MCV RBC AUTO: 90.6 FL (ref 78–100)
PLATELET COUNT - QUEST: 205 10^9/L (ref 150–375)
RBC # BLD AUTO: 4.72 10*12/L (ref 3.8–5.2)
WBC # BLD AUTO: 6.1 10*9/L (ref 4–11)

## 2019-02-27 PROCEDURE — 36415 COLL VENOUS BLD VENIPUNCTURE: CPT | Performed by: FAMILY MEDICINE

## 2019-02-27 PROCEDURE — 85027 COMPLETE CBC AUTOMATED: CPT | Performed by: FAMILY MEDICINE

## 2019-02-27 PROCEDURE — 99214 OFFICE O/P EST MOD 30 MIN: CPT | Performed by: FAMILY MEDICINE

## 2019-02-27 RX ORDER — LEVOTHYROXINE SODIUM 100 UG/1
100 TABLET ORAL DAILY
Qty: 30 TABLET | COMMUNITY
Start: 2019-02-27

## 2019-02-27 ASSESSMENT — MIFFLIN-ST. JEOR: SCORE: 1203.01

## 2019-02-27 NOTE — PROGRESS NOTES
[facility]  [address]  [address]  [facility] [address]  [phone]  Dept: [phone]    PRE-OP EVALUATION:  Today's date: 2019    Olga Hanley (: 1950) presents for pre-operative evaluation assessment as requested by Dr. oJnes.  She requires evaluation and anesthesia risk assessment prior to undergoing surgery/procedure for treatment of right eye cataract .    Proposed Surgery/ Procedure: Cataract  Date of Surgery/ Procedure: 3/6/19  Time of Surgery/ Procedure: 8am  Hospital/Surgical Facility: [facility]  Fax number for surgical facility:   Primary Physician: Evelyn Kilgore  Type of Anesthesia Anticipated: to be determined    Patient has a Health Care Directive or Living Will:  NO    1. NO - Do you have a history of heart attack, stroke, stent, bypass or surgery on an artery in the head, neck, heart or legs?  2. NO - Do you ever have any pain or discomfort in your chest?  3. NO - Do you have a history of  Heart Failure?  4. NO - Are you troubled by shortness of breath when: walking on the level, up a slight hill or at night?  5. NO - Do you currently have a cold, bronchitis or other respiratory infection?  6. NO - Do you have a cough, shortness of breath or wheezing?  7. NO - Do you sometimes get pains in the calves of your legs when you walk?  8. NO - Do you or anyone in your family have previous history of blood clots?  9. NO - Do you or does anyone in your family have a serious bleeding problem such as prolonged bleeding following surgeries or cuts?  10. NO - Have you ever had problems with anemia or been told to take iron pills?  11. NO - Have you had any abnormal blood loss such as black, tarry or bloody stools, or abnormal vaginal bleeding?  12. NO - Have you ever had a blood transfusion?  13. NO - Have you or any of your relatives ever had problems with anesthesia?  14. NO - Do you have sleep apnea, excessive snoring or daytime  drowsiness?  15. NO - Do you have any prosthetic heart valves?  16. NO - Do you have prosthetic joints?  17. NO - Is there any chance that you may be pregnant?      HPI:     HPI related to upcoming procedure:  Right cataract      MEDICAL HISTORY:     Patient Active Problem List    Diagnosis Date Noted     Thyroid cancer (H) 09/20/2016     Priority: Medium     Osteoporosis 09/19/2016     Priority: Medium     Health Care Home 02/18/2013     Priority: Low     State Tier Level:  0  Status:  N/a   Care Coordinator:  n/a  See Letters for [facility] Care Plan           ACP (advance care planning) 04/14/2011     Priority: Low     Advance Care Planning:   ACP Review and Resources Provided:  Reviewed chart for advance care plan.  Olga Hanley has no plan or code status on file. Discussed available resources and provided with information. Confirmed code status reflects current choices pending further ACP discussions.  Confirmed/documented designated decision maker(s). See permanent comments section of demographics in clinical tab.   Added by Anamika Bowie on 3/31/2014              Past Medical History:   Diagnosis Date     Cataract 4/14/2011     Utility update for deleted IMO code [employer] Update utility     Heart murmur 2003     Past Surgical History:   Procedure Laterality Date     C APPENDECTOMY  1998    incidental with hysterectomy     C NONSPECIFIC PROCEDURE  3/2003    cystocele, rectocele, repair Dr Escobar JOYA TOTAL ABDOM HYSTERECTOMY  1988    left oophorectomy; abnormal pap smears     CATARACT IOL, RT/LT  4/2011    left     COLONOSCOPY  2/9/2006    no immediate complication, diverticulitis-sigmoid/ Dr Sameer Pearl     THYROIDECTOMY Right 8/23/2016    Procedure: THYROIDECTOMY;  Surgeon: Prudence Broussard MD;  Location:  OR     THYROIDECTOMY Left 9/20/2016    Procedure: THYROIDECTOMY;  Surgeon: Prudence Broussard MD;  Location: [facility] OR     Current Outpatient Medications   Medication Sig Dispense Refill     Alendronate  "Sodium 70 MG TBEF Take 70 mg by mouth once a week 12 tablet 3     levothyroxine (SYNTHROID/LEVOTHROID) 100 MCG tablet Take 1 tablet (100 mcg) by mouth daily 30 tablet      vitamin D3 (CHOLECALCIFEROL) 1000 units (25 mcg) tablet Take by mouth daily 30 tablet      OTC products: no recent use of OTC ASA, NSAIDS or Steroids  Occasional acetaminophen    Allergies   Allergen Reactions     Sulfa Drugs      difficulting breathing      Latex Allergy: NO    Social History     Tobacco Use     Smoking status: Former Smoker     Smokeless tobacco: Never Used     Tobacco comment: smoking in 20s only   Substance Use Topics     Alcohol use: Yes     Alcohol/week: 1.8 oz     Types: 3 Standard drinks or equivalent per week     History   Drug Use No       REVIEW OF SYSTEMS:   CONSTITUTIONAL: NEGATIVE for fever, chills, change in weight  ENT/MOUTH: NEGATIVE for ear, mouth and throat problems  RESP: NEGATIVE for significant cough or SOB  CV: NEGATIVE for chest pain, palpitations or peripheral edema  GI: NEGATIVE for nausea, abdominal pain, heartburn, or change in bowel habits  MUSCULOSKELETAL: NEGATIVE for significant arthralgias or myalgia  PSYCHIATRIC: NEGATIVE for changes in mood or affect    EXAM:   /68 (BP Location: Left arm, Patient Position: Chair, Cuff Size: Adult Regular)   Pulse 76   Temp 98.3  F (36.8  C) (Oral)   Resp 20   Ht 1.683 m (5' 6.25\")   Wt 64.2 kg (141 lb 9.6 oz)   LMP  (LMP Unknown)   BMI 22.68 kg/m    GENERAL APPEARANCE: healthy, alert and no distress  HENT: ear canals and TM's normal and nose and mouth without ulcers or lesions  RESP: lungs clear to auscultation - no rales, rhonchi or wheezes  CV: regular rate and rhythm, normal S1 S2, no S3 or S4 and no murmur, click or rub   ABDOMEN: soft, nontender, no HSM or masses and bowel sounds normal  NEURO: Normal strength and tone, sensory exam grossly normal, mentation intact and speech normal    DIAGNOSTICS:   EKG: Not indicated due to non-vascular " surgery and low risk of event (age <65 and without cardiac risk factors)    Hemoglobin   Date Value Ref Range Status   02/27/2019 14.0 11.7 - 15.7 g/dL Final   ]      Recent Labs   Lab Test 11/24/17  1204 09/19/16  1125 08/16/16  1348 02/12/16  0938 02/12/16  0936   HGB  --  13.3 13.2  --  13.9   PLT  --   --  287  --  249     --   --  142  --    POTASSIUM 4.5  --   --  3.7  --    CR 0.66  --   --  0.70  --         IMPRESSION:   Reason for surgery/procedure: cataract    The proposed surgical procedure is considered LOW risk.    REVISED CARDIAC RISK INDEX  The patient has the following serious cardiovascular risks for perioperative complications such as (MI, PE, VFib and 3  AV Block):  No serious cardiac risks  INTERPRETATION: 0 risks: Class I (very low risk - 0.4% complication rate)    The patient has the following additional risks for perioperative complications:  No identified additional risks      ICD-10-CM    1. Pre-op exam Z01.818 HEMOGRAM/PLATELET (BFP)     VENOUS COLLECTION   2. Cataract of right eye, unspecified cataract type H26.9        RECOMMENDATIONS:        --Patient is to take all scheduled medications on the day of surgery EXCEPT for modifications listed below.    APPROVAL GIVEN to proceed with proposed procedure, without further diagnostic evaluation       Signed Electronically by: Evelyn Kilgore MD    Copy of this evaluation report is provided to requesting physician.    [facility] Preop Guidelines    Revised Cardiac Risk Index

## 2019-02-27 NOTE — NURSING NOTE
Olga Hanley is here for a pre-op exam.    Questioned patient about current smoking habits.  Pt. quit smoking some time ago.  PULSE regular  My Chart: declines  CLASSIFICATION OF OVERWEIGHT AND OBESITY BY BMI                        Obesity Class           BMI(kg/m2)  Underweight                                    < 18.5  Normal                                         18.5-24.9  Overweight                                     25.0-29.9  OBESITY                     I                  30.0-34.9                             II                 35.0-39.9  EXTREME OBESITY             III                >40                            Patient's  BMI Body mass index is 22.68 kg/m .  http://hin.nhlbi.nih.gov/menuplanner/menu.cgi  Pre-visit planning  Immunizations - up to date  Colonoscopy -   Mammogram - is up to date  Asthma -   PHQ9 -    ELOY-7 -

## 2019-03-18 ENCOUNTER — HOSPITAL ENCOUNTER (OUTPATIENT)
Dept: ULTRASOUND IMAGING | Facility: CLINIC | Age: 69
Discharge: HOME | End: 2019-03-18
Attending: SPECIALIST | Admitting: SPECIALIST
Payer: COMMERCIAL

## 2019-03-18 DIAGNOSIS — Z85.850 HISTORY OF THYROID CANCER: ICD-10-CM

## 2019-03-18 PROCEDURE — 76536 US EXAM OF HEAD AND NECK: CPT

## 2019-10-31 ENCOUNTER — ALLIED HEALTH/NURSE VISIT (OUTPATIENT)
Dept: FAMILY MEDICINE | Facility: CLINIC | Age: 69
End: 2019-10-31

## 2019-10-31 DIAGNOSIS — Z23 NEED FOR VACCINATION: Primary | ICD-10-CM

## 2019-10-31 PROCEDURE — 90686 IIV4 VACC NO PRSV 0.5 ML IM: CPT | Performed by: FAMILY MEDICINE

## 2019-10-31 PROCEDURE — 90471 IMMUNIZATION ADMIN: CPT | Performed by: FAMILY MEDICINE

## 2020-01-02 ENCOUNTER — OFFICE VISIT (OUTPATIENT)
Dept: FAMILY MEDICINE | Facility: CLINIC | Age: 70
End: 2020-01-02

## 2020-01-02 VITALS
TEMPERATURE: 98.3 F | WEIGHT: 142.4 LBS | HEART RATE: 93 BPM | SYSTOLIC BLOOD PRESSURE: 132 MMHG | OXYGEN SATURATION: 98 % | DIASTOLIC BLOOD PRESSURE: 70 MMHG | BODY MASS INDEX: 22.81 KG/M2

## 2020-01-02 DIAGNOSIS — J01.90 ACUTE SINUSITIS WITH SYMPTOMS GREATER THAN 10 DAYS: Primary | ICD-10-CM

## 2020-01-02 PROCEDURE — 99213 OFFICE O/P EST LOW 20 MIN: CPT | Performed by: FAMILY MEDICINE

## 2020-01-02 NOTE — PATIENT INSTRUCTIONS
mucinex (plain)  Lot of water    Sinus pressure is primarily a problem of drainage.  You can best help your body clear the sinus secretions and pressure by opening up the natural passageways which are often blocked by viral colds and allergies.      Short courses of a nasal decongestant spray (Afrin or Neosinephrine) are one of the most effective tools in opening sinus drainage passageways.  Their use should be restricted to 3 days though due to the high risk of nasal addiction.  Pseudoephedrine or phenylephrine (Sudafed) is often helpful but it can cause elevations in blood pressure and insomnia.     Sometimes a nasal saline spray will help rinse out the nasal passages and feel good.     Guaifenesin (Robitussin or Mucinex) helps loosen secretions and often help make the mucous more liquid and easier to clear.    For pain and fevers, acetaminophen (Tylenol) is most appropriate.  Ibuprofen (Advil) or naproxen (Aleve) are useful too and last longer but they can cause elevation of blood pressure or stomach problems.    Antihistamines (Benadryl, Dimetapp, etc.) cause sedation, confusion, bowel and urinary abnormalities and are of little use for infectious causes of cough and nasal congestion.  Their use should be reserved for allergic symptoms.    The body needs to be treated well in order to help heal itself.  Rest as needed.  It is ok to reduce food intake if appetite is poor but it is quite important to maintain/increase fluid intake.  Sinus pressure and infections usually go away on their own with appropriate care.  If symptoms worsen or persist beyond 10 days, an antibiotic might be worth trying to treat a possible bacterial component.

## 2020-01-02 NOTE — NURSING NOTE
Olga is here for cough wheezing no fever or body aches    Pre-visit Screening:  Immunizations:  up to date  Colonoscopy:  is up to date  Mammogram: is up to date  Asthma Action Test/Plan:  FRANK  PHQ9:  NA  GAD7:  NA  Questioned patient about current smoking habits Pt. has never smoked.  Ok to leave detailed message on voice mail for today's visit only Yes, phone # [phone]

## 2020-01-02 NOTE — PROGRESS NOTES
SUBJECTIVE:  69 year old female presents with the following concern:    Cough since Katie  Lots of nasal drainage-  She complains of chest wheezing and rumbling  No fever  headaches    Treatment to date:  Robitussin-Caplets  Tylenol    Patient Active Problem List   Diagnosis     ACP (advance care planning)     Health Care Home     Osteoporosis     Thyroid cancer (H)     Past Surgical History:   Procedure Laterality Date     C APPENDECTOMY  1998    incidental with hysterectomy     C NONSPECIFIC PROCEDURE  3/2003    cystocele, rectocele, repair Dr Escobar JOYA TOTAL ABDOM HYSTERECTOMY  1988    left oophorectomy; abnormal pap smears     CATARACT IOL, RT/LT  4/2011    left     COLONOSCOPY  2/9/2006    no immediate complication, diverticulitis-sigmoid/ Dr Sameer Pearl     THYROIDECTOMY Right 8/23/2016    Procedure: THYROIDECTOMY;  Surgeon: Prudence Broussard MD;  Location: [facility] OR     THYROIDECTOMY Left 9/20/2016    Procedure: THYROIDECTOMY;  Surgeon: Prudence Broussard MD;  Location: [facility] OR     Current Outpatient Medications   Medication     Alendronate Sodium 70 MG TBEF     levothyroxine (SYNTHROID/LEVOTHROID) 100 MCG tablet     vitamin D3 (CHOLECALCIFEROL) 1000 units (25 mcg) tablet     No current facility-administered medications for this visit.       ROS:  7 point ROS neg other than the symptoms noted above in the HPI.    OBJECTIVE:  /70 (BP Location: Right arm, Patient Position: Sitting, Cuff Size: Adult Regular)   Pulse 93   Temp 98.3  F (36.8  C) (Oral)   Wt 64.6 kg (142 lb 6.4 oz)   LMP  (LMP Unknown)   SpO2 98%   BMI 22.81 kg/m    External ears  and canals clear bilaterally. TM's normal bilaterally. Nose congested with purulent discharge. Oropharynx normal. Neck supple without palpable adenopathy.  Chest is clear    Assessment    (J01.90) Acute sinusitis with symptoms greater than 10 days  (primary encounter diagnosis)  Comment:   Plan:  Trial of fluids, mucinex and sinus irrigation    Call or  return to clinic prn if these symtoms worsen, fail to improve as anticipated, or if new symptoms develop.

## 2020-01-22 ENCOUNTER — ALLIED HEALTH/NURSE VISIT (OUTPATIENT)
Dept: FAMILY MEDICINE | Facility: CLINIC | Age: 70
End: 2020-01-22

## 2020-01-22 DIAGNOSIS — Z23 NEED FOR VACCINATION: Primary | ICD-10-CM

## 2020-01-22 PROCEDURE — 90750 HZV VACC RECOMBINANT IM: CPT | Performed by: FAMILY MEDICINE

## 2020-01-22 PROCEDURE — 90471 IMMUNIZATION ADMIN: CPT | Performed by: FAMILY MEDICINE

## 2020-05-12 ENCOUNTER — HOSPITAL ENCOUNTER (OUTPATIENT)
Dept: ULTRASOUND IMAGING | Facility: CLINIC | Age: 70
Discharge: HOME | End: 2020-05-12
Attending: SPECIALIST | Admitting: SPECIALIST
Payer: COMMERCIAL

## 2020-05-12 DIAGNOSIS — Z85.850 HISTORY OF THYROID CANCER: ICD-10-CM

## 2020-05-12 PROCEDURE — 76536 US EXAM OF HEAD AND NECK: CPT

## 2020-05-13 ENCOUNTER — OFFICE VISIT (OUTPATIENT)
Dept: FAMILY MEDICINE | Facility: CLINIC | Age: 70
End: 2020-05-13

## 2020-05-13 VITALS
SYSTOLIC BLOOD PRESSURE: 128 MMHG | WEIGHT: 137.4 LBS | DIASTOLIC BLOOD PRESSURE: 60 MMHG | TEMPERATURE: 97.1 F | HEIGHT: 66 IN | RESPIRATION RATE: 20 BRPM | BODY MASS INDEX: 22.08 KG/M2 | HEART RATE: 84 BPM

## 2020-05-13 DIAGNOSIS — H61.21 IMPACTED CERUMEN OF RIGHT EAR: Primary | ICD-10-CM

## 2020-05-13 PROCEDURE — 69209 REMOVE IMPACTED EAR WAX UNI: CPT | Performed by: FAMILY MEDICINE

## 2020-05-13 ASSESSMENT — MIFFLIN-ST. JEOR: SCORE: 1178.96

## 2020-05-13 NOTE — NURSING NOTE
Olga Hanley is here for a plugged right ear.    Questioned patient about current smoking habits.  Pt. quit smoking some time ago.  PULSE regular  My Chart:   CLASSIFICATION OF OVERWEIGHT AND OBESITY BY BMI                        Obesity Class           BMI(kg/m2)  Underweight                                    < 18.5  Normal                                         18.5-24.9  Overweight                                     25.0-29.9  OBESITY                     I                  30.0-34.9                             II                 35.0-39.9  EXTREME OBESITY             III                >40                            Patient's  BMI Body mass index is 22.01 kg/m .  http://hin.nhlbi.nih.gov/menuplanner/menu.cgi  Pre-visit planning  Immunizations - up to date  Colonoscopy - is due  Mammogram - is up to date  Asthma -   PHQ9 -    ELOY-7 -

## 2020-05-13 NOTE — PROGRESS NOTES
(S) Olga Hanley is a 69 year old female who complains of blockage in right ear for 2 months. No fever or URI symptoms. Has been using q-tips.    Patient Active Problem List   Diagnosis     ACP (advance care planning)     Health Care Home     Osteoporosis     Thyroid cancer (H)     Past Medical History:   Diagnosis Date     Cataract 2011     Utility update for deleted IMO code [employer] Update utility     Heart murmur 2003     Family History   Problem Relation Age of Onset     Lipids Mother         diagnosed in her 70's     Cancer Maternal Aunt 70        breast cancer     Other - See Comments Brother         Turner  at 10     Social History     Socioeconomic History     Marital status:      Spouse name: Calos     Number of children: 1     Years of education: 12     Highest education level: Not on file   Occupational History     Employer: [employer]     Comment:  at night time   Social Needs     Financial resource strain: Not on file     Food insecurity     Worry: Not on file     Inability: Not on file     Transportation needs     Medical: Not on file     Non-medical: Not on file   Tobacco Use     Smoking status: Former Smoker     Smokeless tobacco: Never Used     Tobacco comment: smoking in 20s only   Substance and Sexual Activity     Alcohol use: Yes     Alcohol/week: 3.0 standard drinks     Types: 3 Standard drinks or equivalent per week     Drug use: No     Sexual activity: Yes     Comment:    Lifestyle     Physical activity     Days per week: Not on file     Minutes per session: Not on file     Stress: Not on file   Relationships     Social connections     Talks on phone: Not on file     Gets together: Not on file     Attends Anabaptism service: Not on file     Active member of club or organization: Not on file     Attends meetings of clubs or organizations: Not on file     Relationship status: Not on file     Intimate partner violence     Fear of current or ex  partner: Not on file     Emotionally abused: Not on file     Physically abused: Not on file     Forced sexual activity: Not on file   Other Topics Concern      Service Not Asked     Blood Transfusions Not Asked     Caffeine Concern Not Asked     Occupational Exposure Not Asked     Hobby Hazards Not Asked     Sleep Concern Not Asked     Stress Concern Not Asked     Weight Concern Not Asked     Special Diet Not Asked     Back Care Not Asked     Exercise Yes     Bike Helmet Not Asked     Seat Belt Yes     Self-Exams Yes   Social History Narrative     Not on file     Past Surgical History:   Procedure Laterality Date     C APPENDECTOMY  1998    incidental with hysterectomy     C NONSPECIFIC PROCEDURE  3/2003    cystocele, rectocele, repair Dr Escobar JOYA TOTAL ABDOM HYSTERECTOMY  1988    left oophorectomy; abnormal pap smears     CATARACT IOL, RT/LT  4/2011    left     COLONOSCOPY  2/9/2006    no immediate complication, diverticulitis-sigmoid/ Dr Sameer Pearl     THYROIDECTOMY Right 8/23/2016    Procedure: THYROIDECTOMY;  Surgeon: Prudence Broussard MD;  Location: [facility] OR     THYROIDECTOMY Left 9/20/2016    Procedure: THYROIDECTOMY;  Surgeon: Prudence Broussard MD;  Location: [facility] OR     Alendronate Sodium 70 MG TBEF, Take 70 mg by mouth once a week  levothyroxine (SYNTHROID/LEVOTHROID) 100 MCG tablet, Take 1 tablet (100 mcg) by mouth daily  vitamin D3 (CHOLECALCIFEROL) 1000 units (25 mcg) tablet, Take by mouth daily    No current facility-administered medications on file prior to visit.        Allergies: Sulfa drugs    Immunization History   Administered Date(s) Administered     FLU 6-35 months 09/30/2010     Influenza (IIV3) PF 11/12/2001, 11/29/2002, 11/17/2003, 10/15/2007, 09/30/2010, 10/03/2011, 10/09/2012, 09/30/2013, 10/09/2014, 10/06/2015     Influenza Vaccine IM > 6 months Valent IIV4 09/12/2015, 10/30/2017, 10/15/2018, 10/31/2019     Pneumo Conj 13-V (2010&after) 11/24/2017     Pneumococcal 23 valent  12/05/2018     TD (ADULT, 7+) 12/25/1998, 10/15/2018     TDAP Vaccine (Adacel) 05/22/2008     Zoster vaccine recombinant adjuvanted (SHINGRIX) 01/22/2020     Zoster vaccine, live 03/04/2013        (O) she appears well, afebrile.  right  ear(s) reveals cerumenosis impacting canal, cerumen removed by irrigation  HEENT-pupils equal, round and reactive to light, extraocular movements intact, no conjunctival injection. TM's clear with normal appearing canals (after irrigation). Oropharynx in moist without mucosal lesion.  Maxillary and frontal sinus' non tender.     (A)  right cerumen impaction    (P) Removal attempted by seth, but lavage was necessary to remove all cerumen.  TM was clear on inspection after lavage.  Patient was advised to avoid use of q-tips.  Pt. can use OTC drops or irrigation kits as needed. Follow up if no improvement.

## 2020-07-14 ENCOUNTER — TELEPHONE (OUTPATIENT)
Dept: FAMILY MEDICINE | Facility: CLINIC | Age: 70
End: 2020-07-14

## 2020-07-14 NOTE — LETTER
[facility]  [address]  [place]  [address]    July 14, 2020            Olga Hanley  [address] [place] [address]   [facility] [id number]        Dear Olga Hanley    It has come to our attention while reviewing your records, that you are in need of your second Shingrix    Please call our office at [phone] to schedule a nurse appointment.    If you have had these immunizations done at another facility, please call our office so we can update your records.    Thank you.        [facility]

## 2020-07-29 ENCOUNTER — ALLIED HEALTH/NURSE VISIT (OUTPATIENT)
Dept: FAMILY MEDICINE | Facility: CLINIC | Age: 70
End: 2020-07-29

## 2020-07-29 DIAGNOSIS — Z23 NEED FOR VACCINATION: Primary | ICD-10-CM

## 2020-07-29 PROCEDURE — 90471 IMMUNIZATION ADMIN: CPT | Performed by: FAMILY MEDICINE

## 2020-07-29 PROCEDURE — 90750 HZV VACC RECOMBINANT IM: CPT | Performed by: FAMILY MEDICINE

## 2020-10-21 ENCOUNTER — OFFICE VISIT (OUTPATIENT)
Dept: FAMILY MEDICINE | Facility: CLINIC | Age: 70
End: 2020-10-21

## 2020-10-21 VITALS
SYSTOLIC BLOOD PRESSURE: 122 MMHG | OXYGEN SATURATION: 99 % | HEART RATE: 74 BPM | BODY MASS INDEX: 21.92 KG/M2 | WEIGHT: 136.4 LBS | TEMPERATURE: 98.1 F | HEIGHT: 66 IN | DIASTOLIC BLOOD PRESSURE: 70 MMHG

## 2020-10-21 DIAGNOSIS — Z12.11 SPECIAL SCREENING FOR MALIGNANT NEOPLASMS, COLON: ICD-10-CM

## 2020-10-21 DIAGNOSIS — Z85.850 HISTORY OF THYROID CANCER: ICD-10-CM

## 2020-10-21 DIAGNOSIS — Z00.00 ROUTINE GENERAL MEDICAL EXAMINATION AT A HEALTH CARE FACILITY: Primary | ICD-10-CM

## 2020-10-21 DIAGNOSIS — M81.0 AGE-RELATED OSTEOPOROSIS WITHOUT CURRENT PATHOLOGICAL FRACTURE: ICD-10-CM

## 2020-10-21 DIAGNOSIS — E78.00 PURE HYPERCHOLESTEROLEMIA: ICD-10-CM

## 2020-10-21 LAB
CHOLEST SERPL-MCNC: 232 MG/DL (ref 0–199)
CHOLEST/HDLC SERPL: 3 {RATIO} (ref 0–5)
GLUCOSE SERPL-MCNC: 87 MG/DL (ref 60–99)
HDLC SERPL-MCNC: 88 MG/DL (ref 40–150)
LDLC SERPL CALC-MCNC: 120 MG/DL (ref 0–130)
TRIGL SERPL-MCNC: 118 MG/DL (ref 0–149)

## 2020-10-21 PROCEDURE — 36415 COLL VENOUS BLD VENIPUNCTURE: CPT | Performed by: PHYSICIAN ASSISTANT

## 2020-10-21 PROCEDURE — 99397 PER PM REEVAL EST PAT 65+ YR: CPT | Performed by: PHYSICIAN ASSISTANT

## 2020-10-21 PROCEDURE — 80061 LIPID PANEL: CPT | Performed by: PHYSICIAN ASSISTANT

## 2020-10-21 PROCEDURE — 82947 ASSAY GLUCOSE BLOOD QUANT: CPT | Performed by: PHYSICIAN ASSISTANT

## 2020-10-21 SDOH — HEALTH STABILITY: MENTAL HEALTH: HOW OFTEN DO YOU HAVE A DRINK CONTAINING ALCOHOL?: NOT ASKED

## 2020-10-21 SDOH — HEALTH STABILITY: MENTAL HEALTH: HOW MANY STANDARD DRINKS CONTAINING ALCOHOL DO YOU HAVE ON A TYPICAL DAY?: 1 OR 2

## 2020-10-21 SDOH — HEALTH STABILITY: MENTAL HEALTH: HOW OFTEN DO YOU HAVE 6 OR MORE DRINKS ON ONE OCCASION?: NOT ASKED

## 2020-10-21 ASSESSMENT — MIFFLIN-ST. JEOR: SCORE: 1170.46

## 2020-10-21 NOTE — LETTER
"October 22, 2020      Olgaramiro Hanley  [address] [place] [id number]        Dear ,    It was nice to see you again.  Fasting glucose was normal - no evidence of diabetes.    Your cholesterol levels were elevated.    Limit the amount of saturated and trans fat that you eat. Saturated fat is found in whole milk, cheese, butter, ice cream, cream, lard, fatty cuts of meat, poultry with the skin on, and some tropical vegetable oils, such as coconut and palm kernel oil. Trans fat may be found in stick margarine, shortening, french fries, cookies, crackers, and bakery goods. If the food label has the words \"partially hydrogenated,\" the product probably has trans fat.   If you are not already exercising, get more exercise. Start slowly to avoid injury. 150 minutes per week is recommended  Lose weight if you are overweight or obese and keep a healthy weight. Even a 10% weight loss may lower your cholesterol, and lower your blood sugar and blood pressure, if they are also too high. A 10% weight loss would be 20 pounds if you weigh 200 pounds.   We should re-check your cholesterol in 1 year.     Resulted Orders   Glucose Fasting (BFP)   Result Value Ref Range    Glucose 87 60 - 99 mg/dL   Lipid Panel (BFP)   Result Value Ref Range    Cholesterol 232 (A) 0 - 199 mg/dL    Triglycerides 118 0 - 149 mg/dL    HDL Cholesterol 88 40 - 150 mg/dL    LDL Cholesterol Direct 120 0 - 130 mg/dL    Cholesterol/HDL Ratio 3 0 - 5     If you have any questions or concerns, please call the clinic at the number listed above.     Sincerely,  LES Nzaario  "

## 2020-10-21 NOTE — NURSING NOTE
Olga is here for a fasting CPX.    Pre-Visit Screening:  Immunizations:UTD  Colonoscopy:Need  Mammogram:UTD  Asthma Action Test/Plan:NA  PHQ9:NA  GAD7:NA  Questioned patient about current smoking habits Pt.former smoker  OK to leave a detailed message on voice mail for today's visit yes, phone # [phone]

## 2020-10-21 NOTE — PROGRESS NOTES
SUBJECTIVE:   CC: Olga Hanley is an 69 year old woman who presents for preventive health visit.       Patient has been advised of split billing requirements and indicates understanding: Yes     Patient new to me, former Dr. Kilgore patient, establishing care with me today.  We reviewed patients PHM, medications, Care Gaps, Current Chronic issues.     Healthy Habits:    Do you get at least three servings of calcium containing foods daily (dairy, green leafy vegetables, etc.)? yes    Amount of exercise or daily activities, outside of work: gardening    Problems taking medications regularly No    Medication side effects: No    Have you had an eye exam in the past two years? yes    Do you see a dentist twice per year? yes    Do you have sleep apnea, excessive snoring or daytime drowsiness?no    Colonoscopy due.  - would like to see Dr. Teague    Osteoporosis - Endo managed    Levothyroxine - Endo managed.     Today's PHQ-2 Score:   PHQ-2 ( 1999 Pfizer) 10/21/2020 11/8/2018   Q1: Little interest or pleasure in doing things 0 0   Q2: Feeling down, depressed or hopeless 0 0   PHQ-2 Score 0 0               Social History     Tobacco Use     Smoking status: Former Smoker     Smokeless tobacco: Never Used     Tobacco comment: smoking in 20s only   Substance Use Topics     Alcohol use: Yes     Alcohol/week: 3.0 standard drinks     Types: 3 Standard drinks or equivalent per week     Drinks per session: 1 or 2     If you drink alcohol do you typically have >3 drinks per day or >7 drinks per week? No                     Reviewed orders with patient.  Reviewed health maintenance and updated orders accordingly - Yes  Lab work is in process  Labs reviewed in EPIC  BP Readings from Last 3 Encounters:   10/21/20 122/70   05/13/20 128/60   01/02/20 132/70    Wt Readings from Last 3 Encounters:   10/21/20 61.9 kg (136 lb 6.4 oz)   05/13/20 62.3 kg (137 lb 6.4 oz)   01/02/20 64.6 kg (142 lb 6.4 oz)                  Patient Active  Problem List   Diagnosis     ACP (advance care planning)     Health Care Home     Age-related osteoporosis without current pathological fracture     History of thyroid cancer     Pure hypercholesterolemia     Past Surgical History:   Procedure Laterality Date     C APPENDECTOMY  1998    incidental with hysterectomy     C TOTAL ABDOM HYSTERECTOMY  1988    left oophorectomy; abnormal pap smears     CATARACT IOL, RT/LT  2011    left     CATARACT IOL, RT/LT Right 2019     COLONOSCOPY  2006    no immediate complication, diverticulitis-sigmoid/ Dr Sameer Pearl     THYROIDECTOMY Right 2016    Procedure: THYROIDECTOMY;  Surgeon: Prudence Broussard MD;  Location: [facility]     THYROIDECTOMY Left 2016    Procedure: THYROIDECTOMY;  Surgeon: Prudence Broussard MD;  Location: [facility] OR     ZZC NONSPECIFIC PROCEDURE  2003    cystocele, rectocele, repair Dr Cody       Social History     Tobacco Use     Smoking status: Former Smoker     Smokeless tobacco: Never Used     Tobacco comment: smoking in 20s only   Substance Use Topics     Alcohol use: Yes     Alcohol/week: 3.0 standard drinks     Types: 3 Standard drinks or equivalent per week     Drinks per session: 1 or 2     Family History   Problem Relation Age of Onset     Lipids Mother         diagnosed in her 70's     Cancer Maternal Aunt 70        breast cancer     Other - See Comments Brother         Turner  at 10         Current Outpatient Medications   Medication Sig Dispense Refill     Alendronate Sodium 70 MG TBEF Take 70 mg by mouth once a week 12 tablet 3     levothyroxine (SYNTHROID/LEVOTHROID) 100 MCG tablet Take 1 tablet (100 mcg) by mouth daily 30 tablet      vitamin D3 (CHOLECALCIFEROL) 1000 units (25 mcg) tablet Take by mouth daily 30 tablet      Allergies   Allergen Reactions     Sulfa Drugs      difficulting breathing     Recent Labs   Lab Test 17  1204 16  0938 13  0916   LDL  --  108 107   HDL  --  94  95   TRIG  --  67 75   CR 0.66 0.70 0.79   GFRESTIMATED 93 92 81   POTASSIUM 4.5 3.7 3.9        Mammogram Screening: Patient over age 50, mutual decision to screen reflected in health maintenance.    Pertinent mammograms are reviewed under the imaging tab.  History of abnormal Pap smear: NO - age 65 - see link Cervical Cytology Screening Guidelines  PAP / HPV Latest Ref Rng & Units 2/12/2016 11/4/2004 6/18/2003   PAP DATE - [facility] - - DNR    HPV DNA INT/HIGH RISK NOT DETECTED NOT DETECTED - -   HPV MRNA E6/E7 - TNP - -     Reviewed and updated as needed this visit by clinical staff  Tobacco  Allergies  Meds  Problems  Med Hx  Surg Hx  Fam Hx          Reviewed and updated as needed this visit by Provider                Past Medical History:   Diagnosis Date     Cataract 04/14/2011     Utility update for deleted IMO code [employer] Update utility     Heart murmur 01/01/2003     Malignant neoplasm of thyroid gland (H) 2016      Past Surgical History:   Procedure Laterality Date     C APPENDECTOMY  01/01/1998    incidental with hysterectomy     C TOTAL ABDOM HYSTERECTOMY  01/01/1988    left oophorectomy; abnormal pap smears     CATARACT IOL, RT/LT  04/01/2011    left     CATARACT IOL, RT/LT Right 2019     COLONOSCOPY  02/09/2006    no immediate complication, diverticulitis-sigmoid/ Dr Sameer Pearl     THYROIDECTOMY Right 08/23/2016    Procedure: THYROIDECTOMY;  Surgeon: Prudence Broussard MD;  Location: [facility]     THYROIDECTOMY Left 09/20/2016    Procedure: THYROIDECTOMY;  Surgeon: Prudecne Broussard MD;  Location: [facility]     ZZC NONSPECIFIC PROCEDURE  03/01/2003    cystocele, rectocele, repair Dr Cody       ROS:  CONSTITUTIONAL: NEGATIVE for fever, chills, change in weight  INTEGUMENTARY/SKIN: NEGATIVE for worrisome rashes, moles or lesions  EYES: NEGATIVE for vision changes or irritation  ENT: NEGATIVE for ear, mouth and throat problems  RESP: NEGATIVE for significant cough or SOB  BREAST: NEGATIVE for masses,  "tenderness or discharge  CV: NEGATIVE for chest pain, palpitations or peripheral edema  GI: NEGATIVE for nausea, abdominal pain, heartburn, or change in bowel habits  : NEGATIVE for unusual urinary or vaginal symptoms. No vaginal bleeding.  MUSCULOSKELETAL: NEGATIVE for significant arthralgias or myalgia  NEURO: NEGATIVE for weakness, dizziness or paresthesias  PSYCHIATRIC: NEGATIVE for changes in mood or affect     OBJECTIVE:   /70 (BP Location: Left arm, Patient Position: Sitting, Cuff Size: Adult Large)   Pulse 74   Temp 98.1  F (36.7  C) (Oral)   Ht 1.676 m (5' 6\")   Wt 61.9 kg (136 lb 6.4 oz)   LMP  (LMP Unknown)   SpO2 99%   BMI 22.02 kg/m    EXAM:  GENERAL: healthy, alert and no distress  EYES: Eyes grossly normal to inspection, PERRL and conjunctivae and sclerae normal  HENT: ear canals and TM's normal, nose and mouth without ulcers or lesions  NECK: no adenopathy, no asymmetry, masses, or scars and thyroid normal to palpation  RESP: lungs clear to auscultation - no rales, rhonchi or wheezes  CV: regular rate and rhythm, normal S1 S2, no S3 or S4, no murmur, click or rub, no peripheral edema and peripheral pulses strong  ABDOMEN: soft, nontender, no hepatosplenomegaly, no masses and bowel sounds normal  Breasts:   Right:skin without rash, no dominant mass, no nipple discharge, or axillary adenopathy. No tenderness to palpation.  Left: skin without rash, no dominant mass, no nipple discharge, or axillary adenopathy. No tenderness to palpation.    MS: no gross musculoskeletal defects noted, no edema  SKIN: no suspicious lesions or rashes  NEURO: Normal strength and tone, mentation intact and speech normal  PSYCH: mentation appears normal, affect normal/bright    Diagnostic Test Results:  Labs reviewed in Epic    ASSESSMENT/PLAN:   1. Routine general medical examination at a health care facility    - Glucose Fasting (BFP)  - Lipid Panel (BFP)  - VENOUS COLLECTION    2. Pure " "hypercholesterolemia      3. Special screening for malignant neoplasms, colon    - GASTROENTEROLOGY ADULT REF PROCEDURE ONLY; Future    4. History of thyroid cancer      5. Age-related osteoporosis without current pathological fracture        Patient has been advised of split billing requirements and indicates understanding: Yes  COUNSELING:   Special attention given to:        Regular exercise       Healthy diet/nutrition       Colon cancer screening    Estimated body mass index is 22.02 kg/m  as calculated from the following:    Height as of this encounter: 1.676 m (5' 6\").    Weight as of this encounter: 61.9 kg (136 lb 6.4 oz).        She reports that she has quit smoking. She has never used smokeless tobacco.      Counseling Resources:  ATP IV Guidelines  Pooled Cohorts Equation Calculator  Breast Cancer Risk Calculator  BRCA-Related Cancer Risk Assessment: FHS-7 Tool  FRAX Risk Assessment  ICSI Preventive Guidelines  Dietary Guidelines for Americans, 2010  USDA's MyPlate  ASA Prophylaxis  Lung CA Screening    LES Nazario  [facility]  "

## 2020-10-21 NOTE — PATIENT INSTRUCTIONS
Preventive Health Recommendations    See your health care provider every year to    Review health changes.     Discuss preventive care.      Review your medicines if your doctor has prescribed any.      You no longer need a yearly Pap test unless you've had an abnormal Pap test in the past 10 years. If you have vaginal symptoms, such as bleeding or discharge, be sure to talk with your provider about a Pap test.      Every 1 to 2 years, have a mammogram.  If you are over 69, talk with your health care provider about whether or not you want to continue having screening mammograms.      Every 10 years, have a colonoscopy. Or, have a yearly FIT test (stool test). These exams will check for colon cancer.       Have a cholesterol test every 5 years, or more often if your doctor advises it.       Have a diabetes test (fasting glucose) every three years. If you are at risk for diabetes, you should have this test more often.       At age 65, have a bone density scan (DEXA) to check for osteoporosis (brittle bone disease).    Shots:    Get a flu shot each year.    Get a tetanus shot every 10 years.    Talk to your doctor about your pneumonia vaccines. There are now two you should receive - Pneumovax (PPSV 23) and Prevnar (PCV 13).    Talk to your pharmacist about the shingles vaccine.    Talk to your doctor about the hepatitis B vaccine.    Nutrition:     Eat at least 5 servings of fruits and vegetables each day.      Eat whole-grain bread, whole-wheat pasta and brown rice instead of white grains and rice.      Get adequate about Calcium and Vitamin D.     Lifestyle    Exercise at least 150 minutes a week (30 minutes a day, 5 days a week). This will help you control your weight and prevent disease.      Limit alcohol to one drink per day.      No smoking.       Wear sunscreen to prevent skin cancer.       See your dentist twice a year for an exam and cleaning.      See your eye doctor every 1 to 2 years to screen for  conditions such as glaucoma, macular degeneration, cataracts, etc.    Personalized Prevention Plan  You are due for the preventive services outlined below.  Your care team is available to assist you in scheduling these services.  If you have already completed any of these items, please share that information with your care team to update in your medical record.    Health Maintenance Due   Topic Date Due     Colorectal Cancer Screening  11/17/1960     FALL RISK ASSESSMENT  11/08/2019     PHQ-2  01/01/2020     Flu Vaccine (1) 09/01/2020

## 2021-10-22 ENCOUNTER — ALLIED HEALTH/NURSE VISIT (OUTPATIENT)
Dept: FAMILY MEDICINE | Facility: CLINIC | Age: 71
End: 2021-10-22

## 2021-10-22 DIAGNOSIS — Z23 NEED FOR VACCINATION: Primary | ICD-10-CM

## 2021-10-22 PROCEDURE — 90662 IIV NO PRSV INCREASED AG IM: CPT | Performed by: PHYSICIAN ASSISTANT

## 2021-10-22 PROCEDURE — 90471 IMMUNIZATION ADMIN: CPT | Performed by: PHYSICIAN ASSISTANT

## 2022-04-18 NOTE — PROGRESS NOTES
Assessment & Plan     Pure hypercholesterolemia  10 year risk > 7.5%  Labs today  - Lipid Panel (BFP)  - VENOUS COLLECTION    The 10-year ASCVD risk score (Gabriel PERALTA Jr., et al., 2013) is: 8.6%    Values used to calculate the score:      Age: 71 years      Sex: Female      Is Non- : No      Diabetic: No      Tobacco smoker: No      Systolic Blood Pressure: 132 mmHg      Is BP treated: No      HDL Cholesterol: 87 mg/dL      Total Cholesterol: 241 mg/dL     Pt elects to work on diet/excerise to reduce Chol  Will see me 3-4 months fasting to recheck  No family hx of CAD      Screen for colon cancer    - Adult Gastro Ref - Procedure Only    Bilateral impacted cerumen  Advised Debrox 3x week    Age-related osteoporosis without current pathological fracture  History of thyroid cancer  Pt declines thyroid labs  I left message with Dr. Mccall to see if she wants me to order DEXA or thyroid US prior to pts appt.        FUTURE APPOINTMENTS:       - Follow-up visit in 1 year fasting physical      LES Nazario  [facility]    Subjective     Nursing Notes:   Risa Dill, KENNETH  4/19/2022  9:32 AM  Signed  Olga Hanley is here for a medication check and possible breast exam.    Questioned patient about current smoking habits.  Pt. has never smoked.  PULSE regular  My Chart:   CLASSIFICATION OF OVERWEIGHT AND OBESITY BY BMI                        Obesity Class           BMI(kg/m2)  Underweight                                    < 18.5  Normal                                         18.5-24.9  Overweight                                     25.0-29.9  OBESITY                     I                  30.0-34.9                             II                 35.0-39.9  EXTREME OBESITY             III                >40                            Patient's  BMI Body mass index is 24.21 kg/m .  http://hin.nhlbi.nih.gov/menuplanner/menu.cgi  Pre-visit  "planning  Immunizations - up to date  Colonoscopy - is up to date  Mammogram - is up to date  Asthma -   PHQ9 -    ELOY-7 -                Olga Hanley is a 71 year old female who presents to clinic today for the following health issues     HPI     Hx thyroid cancer and osteoporosis    Fosamax for osteoporosis - Last DEXA per Endo was 2020    Thyroid Cancer 2016 - Seeing Endo in one month - Dr. Mccall    Started Claritin - itchy eyes, nasal stuffiness    Does get ear wax buildup - does feel full    Wt Readings from Last 5 Encounters:   04/19/22 68 kg (150 lb)   10/21/20 61.9 kg (136 lb 6.4 oz)   05/13/20 62.3 kg (137 lb 6.4 oz)   01/02/20 64.6 kg (142 lb 6.4 oz)   02/27/19 64.2 kg (141 lb 9.6 oz)           Current Medications  Current Outpatient Medications   Medication Sig Dispense Refill     alendronate (FOSAMAX) 70 MG tablet Take 1 tablet (70 mg) by mouth every 7 days  3     levothyroxine (SYNTHROID/LEVOTHROID) 100 MCG tablet Take 1 tablet (100 mcg) by mouth daily 30 tablet      loratadine (CLARITIN) 10 MG tablet Take 1 tablet (10 mg) by mouth daily       vitamin D3 (CHOLECALCIFEROL) 1000 units (25 mcg) tablet Take by mouth daily 30 tablet          Constitutional, HEENT, Cardiovascular, Pulmonary, GI and  systems are negative, except as otherwise noted.          Objective    /78 (BP Location: Left arm, Patient Position: Chair, Cuff Size: Adult Regular)   Pulse 76   Temp 98  F (36.7  C)   Resp 20   Ht 1.676 m (5' 6\")   Wt 68 kg (150 lb)   LMP  (LMP Unknown)   BMI 24.21 kg/m    Body mass index is 24.21 kg/m .  Physical Exam   GENERAL: healthy, alert and no distress  Head: Normocephalic, atraumatic.  Eyes: Conjunctiva clear, no discharge  Ears: Bilateral cerumen impaction  Nose: Nasal mucosa pink and moist. No discharge.  Mouth / Throat: Mucous membranes moist. Normal dentition.  Pharynx non-erythematous, no exudates.   Neck: Supple, No thyromegaly or nodules. No lymphadenopathy.  RESP: lungs " clear to auscultation - no rales, rhonchi or wheezes  CV: regular rate and rhythm, normal S1 S2, no S3 or S4, no murmur, click or rub, no peripheral edema and peripheral pulses strong  MS: no gross musculoskeletal defects noted, no edema

## 2022-04-19 ENCOUNTER — OFFICE VISIT (OUTPATIENT)
Dept: FAMILY MEDICINE | Facility: CLINIC | Age: 72
End: 2022-04-19

## 2022-04-19 VITALS
BODY MASS INDEX: 24.11 KG/M2 | TEMPERATURE: 98 F | HEART RATE: 76 BPM | RESPIRATION RATE: 20 BRPM | SYSTOLIC BLOOD PRESSURE: 132 MMHG | WEIGHT: 150 LBS | DIASTOLIC BLOOD PRESSURE: 78 MMHG | HEIGHT: 66 IN

## 2022-04-19 DIAGNOSIS — Z91.89 10 YEAR RISK OF MI OR STROKE 7.5% OR GREATER: ICD-10-CM

## 2022-04-19 DIAGNOSIS — E78.00 PURE HYPERCHOLESTEROLEMIA: Primary | ICD-10-CM

## 2022-04-19 DIAGNOSIS — Z12.11 SCREEN FOR COLON CANCER: ICD-10-CM

## 2022-04-19 DIAGNOSIS — M81.0 AGE-RELATED OSTEOPOROSIS WITHOUT CURRENT PATHOLOGICAL FRACTURE: ICD-10-CM

## 2022-04-19 DIAGNOSIS — H61.23 BILATERAL IMPACTED CERUMEN: ICD-10-CM

## 2022-04-19 DIAGNOSIS — Z85.850 HISTORY OF THYROID CANCER: ICD-10-CM

## 2022-04-19 LAB
CHOLEST SERPL-MCNC: 241 MG/DL (ref 0–199)
CHOLEST/HDLC SERPL: 3 {RATIO} (ref 0–5)
HDLC SERPL-MCNC: 87 MG/DL (ref 40–150)
LDLC SERPL CALC-MCNC: 130 MG/DL (ref 0–130)
TRIGL SERPL-MCNC: 120 MG/DL (ref 0–149)

## 2022-04-19 PROCEDURE — 69209 REMOVE IMPACTED EAR WAX UNI: CPT | Performed by: PHYSICIAN ASSISTANT

## 2022-04-19 PROCEDURE — 99213 OFFICE O/P EST LOW 20 MIN: CPT | Mod: 25 | Performed by: PHYSICIAN ASSISTANT

## 2022-04-19 PROCEDURE — 36415 COLL VENOUS BLD VENIPUNCTURE: CPT | Performed by: PHYSICIAN ASSISTANT

## 2022-04-19 PROCEDURE — 80061 LIPID PANEL: CPT | Performed by: PHYSICIAN ASSISTANT

## 2022-04-19 RX ORDER — LORATADINE 10 MG/1
10 TABLET ORAL DAILY
COMMUNITY
Start: 2022-04-19

## 2022-04-19 RX ORDER — ALENDRONATE SODIUM 70 MG/1
70 TABLET ORAL
Refills: 3 | COMMUNITY
Start: 2022-04-19

## 2022-04-19 NOTE — NURSING NOTE
Olga Hanley is here for a medication check and possible breast exam.    Questioned patient about current smoking habits.  Pt. has never smoked.  PULSE regular  My Chart:   CLASSIFICATION OF OVERWEIGHT AND OBESITY BY BMI                        Obesity Class           BMI(kg/m2)  Underweight                                    < 18.5  Normal                                         18.5-24.9  Overweight                                     25.0-29.9  OBESITY                     I                  30.0-34.9                             II                 35.0-39.9  EXTREME OBESITY             III                >40                            Patient's  BMI Body mass index is 24.21 kg/m .  http://hin.nhlbi.nih.gov/menuplanner/menu.cgi  Pre-visit planning  Immunizations - up to date  Colonoscopy - is up to date  Mammogram - is up to date  Asthma -   PHQ9 -    ELOY-7 -

## 2022-04-19 NOTE — LETTER
April 19, 2022      Olga Hanley  [address] [place] [address]   [facility] [id number]        Dear MsNaliniTalon,    Here are the labs we discussed on the phone.  See you back in 3-4 months fasting for recheck.   Here is the risk calculator, 7.5 and below is the goal.   The 10-year ASCVD risk score (Gabriel PERALTA Jr., et al., 2013) is: 8.6%    Values used to calculate the score:      Age: 71 years      Sex: Female      Is Non- : No      Diabetic: No      Tobacco smoker: No      Systolic Blood Pressure: 132 mmHg      Is BP treated: No      HDL Cholesterol: 87 mg/dL      Total Cholesterol: 241 mg/dL      Resulted Orders   Lipid Panel (BFP)   Result Value Ref Range    Cholesterol 241 (A) 0 - 199 mg/dL    Triglycerides 120 0 - 149 mg/dL    HDL Cholesterol 87 40 - 150 mg/dL    LDL Cholesterol Direct 130 0 - 130 mg/dL    Cholesterol/HDL Ratio 3 0 - 5       If you have any questions or concerns, please call the clinic at the number listed above.       Sincerely,      LES Nazario

## 2022-06-14 ENCOUNTER — DOCUMENTATION ONLY (OUTPATIENT)
Dept: OTHER | Facility: CLINIC | Age: 72
End: 2022-06-14
Payer: COMMERCIAL

## 2022-10-11 ENCOUNTER — OFFICE VISIT (OUTPATIENT)
Dept: FAMILY MEDICINE | Facility: CLINIC | Age: 72
End: 2022-10-11

## 2022-10-11 VITALS
WEIGHT: 149.2 LBS | TEMPERATURE: 98.1 F | BODY MASS INDEX: 23.98 KG/M2 | DIASTOLIC BLOOD PRESSURE: 80 MMHG | HEIGHT: 66 IN | HEART RATE: 74 BPM | OXYGEN SATURATION: 96 % | SYSTOLIC BLOOD PRESSURE: 128 MMHG

## 2022-10-11 DIAGNOSIS — Z91.89 10 YEAR RISK OF MI OR STROKE 7.5% OR GREATER: ICD-10-CM

## 2022-10-11 DIAGNOSIS — E78.00 PURE HYPERCHOLESTEROLEMIA: Primary | ICD-10-CM

## 2022-10-11 DIAGNOSIS — Z85.850 HISTORY OF THYROID CANCER: ICD-10-CM

## 2022-10-11 DIAGNOSIS — M81.0 AGE-RELATED OSTEOPOROSIS WITHOUT CURRENT PATHOLOGICAL FRACTURE: ICD-10-CM

## 2022-10-11 LAB
ALBUMIN SERPL-MCNC: 4.8 G/DL (ref 3.6–5.1)
ALBUMIN/GLOB SERPL: 2 {RATIO} (ref 1–2.5)
ALP SERPL-CCNC: 88 U/L (ref 33–130)
ALT 1742-6: 14 U/L (ref 0–32)
AST 1920-8: 17 U/L (ref 0–35)
BILIRUB SERPL-MCNC: 1.2 MG/DL (ref 0.2–1.2)
BUN SERPL-MCNC: 10 MG/DL (ref 7–25)
BUN/CREATININE RATIO: 12 (ref 6–22)
CALCIUM SERPL-MCNC: 9.9 MG/DL (ref 8.6–10.3)
CHLORIDE SERPLBLD-SCNC: 104.8 MMOL/L (ref 98–110)
CHOLEST SERPL-MCNC: 225 MG/DL (ref 0–199)
CHOLEST/HDLC SERPL: 3 {RATIO} (ref 0–5)
CO2 SERPL-SCNC: 30 MMOL/L (ref 20–32)
CREAT SERPL-MCNC: 0.83 MG/DL (ref 0.6–1.3)
GLOBULIN, CALCULATED - QUEST: 2.4 (ref 1.9–3.7)
GLUCOSE SERPL-MCNC: 91 MG/DL (ref 60–99)
HDLC SERPL-MCNC: 84 MG/DL (ref 40–150)
LDLC SERPL CALC-MCNC: 121 MG/DL (ref 0–130)
POTASSIUM SERPL-SCNC: 4.27 MMOL/L (ref 3.5–5.3)
PROT SERPL-MCNC: 7.2 G/DL (ref 6.1–8.1)
SODIUM SERPL-SCNC: 141.3 MMOL/L (ref 135–146)
TRIGL SERPL-MCNC: 98 MG/DL (ref 0–149)

## 2022-10-11 PROCEDURE — 90471 IMMUNIZATION ADMIN: CPT | Performed by: PHYSICIAN ASSISTANT

## 2022-10-11 PROCEDURE — 36415 COLL VENOUS BLD VENIPUNCTURE: CPT | Performed by: PHYSICIAN ASSISTANT

## 2022-10-11 PROCEDURE — 99214 OFFICE O/P EST MOD 30 MIN: CPT | Mod: 25 | Performed by: PHYSICIAN ASSISTANT

## 2022-10-11 PROCEDURE — 80061 LIPID PANEL: CPT | Performed by: PHYSICIAN ASSISTANT

## 2022-10-11 PROCEDURE — 80053 COMPREHEN METABOLIC PANEL: CPT | Performed by: PHYSICIAN ASSISTANT

## 2022-10-11 PROCEDURE — 90662 IIV NO PRSV INCREASED AG IM: CPT | Performed by: PHYSICIAN ASSISTANT

## 2022-10-11 NOTE — LETTER
2022      Olga Hanley  [address] [place] [id number]        Dear ,    Here is your DEXA - I will fax a copy to your endocrinologist     Resulted Orders   Dexa hip/pelvis/spine*    Narrative    BONE DENSITOMETRY  [facility]  [address] [place] [address]  2022      PATIENT: Olga Hanley  CHART: [id number]   :  1950  AGE:  72 year old  SEX:  female   REFERRING PROVIDER:  Raisa De Luna PA     PROCEDURE:  Bone density scanning was performed using DXA technology of   the lumbar spine and hip.  Scanning was performed on a Lunar Prodigy   scanner.  Reporting is completed in the form of a T-score.  The T-score   represents the standard deviation from peak bone mass based on a young   healthy adult.     REFERENCE T-SCORES:       Normal                -1.0 and greater                                 Osteopenia         Between -1.0 and -2.5                                             Osteoporosis     -2.5 and less                                       RISK FACTORS:  Post-menopausal, Parent history of osteoporosis, Parent   history of a hip fracture, history of osteoporosis    CURRENT TREATMENT:  Fosamax (alendronate), Vitamin D     FINDINGS:               Lumbar Spine (L1-L4)      T-score:  -3.0, degenerative   changes present               Left Femoral Neck            T-score:  -2.1               Right Femoral Neck          T-score:  -1.7                            Lumbar (L1-L4) BMD: 0.820                             Total Hip Mean BMD: 0.845      IMPRESSION  Osteoporosis., Degenerative changes of the lumbar spine which may falsely   elevate results.    Patient had a study performed previously, however the scans are not   available to compare to the current study.     Recommendations include ensuring adequate Calcium and Vitamin D.    Follow up can be considered in 2 years.   ___________________  Christine Lawson,  M.D.  Electronically signed             If you have any questions or concerns, please call the clinic at the number listed above.       Sincerely,      LES Nazario

## 2022-10-11 NOTE — NURSING NOTE
Chief Complaint   Patient presents with     RECHECK     Recheck cholesterol,  fasting today     Pre-visit Screening:  Immunizations:  up to date  Colonoscopy:  is up to date  Mammogram: is up to date  Asthma Action Test/Plan:  NA  PHQ9:  NA  GAD7:  NA  Questioned patient about current smoking habits Pt. quit smoking some time ago.  Ok to leave detailed message on voice mail for today's visit only Yes, phone # [phone]

## 2022-10-11 NOTE — PROGRESS NOTES
Assessment & Plan     Pure hypercholesterolemia    Will start Lipitor  RTC in 8 weeks -  - Comprehensive Metobolic Panel (BFP)  - Lipid Panel (BFP)  - VENOUS COLLECTION    10 year risk of MI or stroke 7.5% or greater    - Comprehensive Metobolic Panel (BFP)  - Lipid Panel (BFP)  - VENOUS COLLECTION    History of thyroid cancer      Age-related osteoporosis without current pathological fracture    - Dexa hip/pelvis/spine*            LES Nazario  [facility]    Subjective     Nursing Notes:   Delaney Becerra CMA  10/11/2022  9:11 AM  Signed  Chief Complaint   Patient presents with     RECHECK     Recheck cholesterol,  fasting today     Pre-visit Screening:  Immunizations:  up to date  Colonoscopy:  is up to date  Mammogram: is up to date  Asthma Action Test/Plan:  NA  PHQ9:  NA  GAD7:  NA  Questioned patient about current smoking habits Pt. quit smoking some time ago.  Ok to leave detailed message on voice mail for today's visit only Yes, phone # [phone]                Olga Hanley is a 71 year old female who presents to clinic today for the following health issues     HPI     Recheck cholesterol today  Has dec. Cholesterol in diet  Metamucil daily    Mother with hypertension, on warfarin for atrial fibrillation          The 10-year ASCVD risk score (Indu DK, et al., 2019) is: 8%    Values used to calculate the score:      Age: 71 years      Sex: Female      Is Non- : No      Diabetic: No      Tobacco smoker: No      Systolic Blood Pressure: 128 mmHg      Is BP treated: No      HDL Cholesterol: 84 mg/dL      Total Cholesterol: 225 mg/dL    Sees Dr. Mccall - hx thyroid cancer and Osteoporosis   Needs DEXA  Alendronate started 2018      Current Medications  Current Outpatient Medications   Medication Sig Dispense Refill     alendronate (FOSAMAX) 70 MG tablet Take 1 tablet (70 mg) by mouth every 7 days  3     levothyroxine (SYNTHROID/LEVOTHROID) 100  "MCG tablet Take 1 tablet (100 mcg) by mouth daily 30 tablet      loratadine (CLARITIN) 10 MG tablet Take 1 tablet (10 mg) by mouth daily       vitamin D3 (CHOLECALCIFEROL) 1000 units (25 mcg) tablet Take by mouth daily 30 tablet          Constitutional, HEENT, Cardiovascular, Pulmonary, GI and  systems are negative, except as otherwise noted.          Objective    /80 (BP Location: Right arm, Patient Position: Sitting, Cuff Size: Adult Regular)   Pulse 74   Temp 98.1  F (36.7  C) (Temporal)   Ht 1.676 m (5' 6\")   Wt 67.7 kg (149 lb 3.2 oz)   LMP  (LMP Unknown)   SpO2 96%   BMI 24.08 kg/m    Body mass index is 24.08 kg/m .  Physical Exam   GENERAL: healthy, alert and no distress  RESP: lungs clear to auscultation - no rales, rhonchi or wheezes  CV: regular rate and rhythm, normal S1 S2, no S3 or S4, no murmur, click or rub, no peripheral edema and peripheral pulses strong  Abdomen: Normal bowel sounds. Soft, no masses appreciated, no organomegaly. Non-tender. No guarding, no rebound tenderness.   MS: no gross musculoskeletal defects noted        "

## 2022-10-14 ENCOUNTER — TELEPHONE (OUTPATIENT)
Dept: FAMILY MEDICINE | Facility: CLINIC | Age: 72
End: 2022-10-14

## 2022-10-14 RX ORDER — ATORVASTATIN CALCIUM 10 MG/1
10 TABLET, FILM COATED ORAL DAILY
Qty: 90 TABLET | Refills: 0 | Status: SHIPPED | OUTPATIENT
Start: 2022-10-14 | End: 2022-12-09

## 2022-10-14 NOTE — TELEPHONE ENCOUNTER
Please call pt  Her labs indicate she should start Lipitor which we discussed at her last visit  I sent in 90 day prescription    Please tell her to est. Care with Dr. Díaz and I would like her to see Dr. Díaz in 8 weeks for a fasting med check    Can close once you relay info  OKTLM on her cell    Ok to leave detailed message on voice mail for today's visit only Yes, phone # [phone]    Raisa De Luna PA-C  10/14/2022

## 2022-12-01 ENCOUNTER — ANCILLARY PROCEDURE (OUTPATIENT)
Dept: BONE DENSITY | Facility: CLINIC | Age: 72
End: 2022-12-01
Attending: PHYSICIAN ASSISTANT
Payer: COMMERCIAL

## 2022-12-07 NOTE — PROGRESS NOTES
Assessment & Plan   Problem List Items Addressed This Visit        Endocrine    Pure hypercholesterolemia    Relevant Medications    atorvastatin (LIPITOR) 10 MG tablet    Other Relevant Orders    VENOUS COLLECTION (Completed)    Lipid Panel (BFP)    Comprehensive Metobolic Panel (BFP)       Other    10 year risk of MI or stroke 7.5% or greater    Relevant Medications    atorvastatin (LIPITOR) 10 MG tablet   Other Visit Diagnoses     Initial Medicare annual wellness visit    -  Primary           1. Pure hypercholesterolemia  She is doing well on medications, refilled.  - atorvastatin (LIPITOR) 10 MG tablet; Take 1 tablet (10 mg) by mouth daily  Dispense: 90 tablet; Refill: 0  - VENOUS COLLECTION  - Lipid Panel (BFP)  - Comprehensive Metobolic Panel (BFP)    2. 10 year risk of MI or stroke 7.5% or greater  Refilled.  - atorvastatin (LIPITOR) 10 MG tablet; Take 1 tablet (10 mg) by mouth daily  Dispense: 90 tablet; Refill: 0    3. Initial Medicare annual wellness visit  completed           FUTURE APPOINTMENTS:       - Follow-up visit in 12 months    No follow-ups on file.    Melody Díaz MD  [facility]    Subjective     Nursing Notes:   Delaney Becerra CMA  12/9/2022  9:06 AM  Signed  Chief Complaint   Patient presents with     Recheck Medication     Fasting today, recheck Atorvastatin was started on this 6 weeks ago      Pre-visit Screening:  Immunizations:  up to date  Colonoscopy:  is up to date  Mammogram: is up to date  Asthma Action Test/Plan:  NA  PHQ9:  NA  GAD7:  NA  Questioned patient about current smoking habits Pt. has never smoked.  Ok to leave detailed message on voice mail for today's visit only Yes, phone # [phone]           Olga Hanley is a 72 year old female who presents to clinic today for the following health issues     HPI     Here for a refill on her cholesterol medications. She is doing well. Due for labs and refills. Taking her medications daily.        Review  "of Systems   Constitutional, HEENT, cardiovascular, pulmonary, gi and gu systems are negative, except as otherwise noted.      Objective    /78 (BP Location: Right arm, Patient Position: Sitting, Cuff Size: Adult Large)   Pulse 79   Temp 98.5  F (36.9  C) (Temporal)   Ht 1.676 m (5' 6\")   Wt 67.9 kg (149 lb 9.6 oz)   LMP  (LMP Unknown)   SpO2 98%   BMI 24.15 kg/m    Body mass index is 24.15 kg/m .  Physical Exam   GENERAL: healthy, alert and no distress  RESP: lungs clear to auscultation - no rales, rhonchi or wheezes  CV: regular rate and rhythm, normal S1 S2, no S3 or S4, no murmur, click or rub, no peripheral edema and peripheral pulses strong  ABDOMEN: soft, nontender, no hepatosplenomegaly, no masses and bowel sounds normal  MS: no gross musculoskeletal defects noted, no edema  NEURO: Normal strength and tone, mentation intact and speech normal  PSYCH: mentation appears normal, affect normal/bright    No results found for any visits on 12/09/22.      "

## 2022-12-09 ENCOUNTER — OFFICE VISIT (OUTPATIENT)
Dept: FAMILY MEDICINE | Facility: CLINIC | Age: 72
End: 2022-12-09

## 2022-12-09 VITALS
BODY MASS INDEX: 24.04 KG/M2 | OXYGEN SATURATION: 98 % | TEMPERATURE: 98.5 F | SYSTOLIC BLOOD PRESSURE: 126 MMHG | HEART RATE: 79 BPM | HEIGHT: 66 IN | DIASTOLIC BLOOD PRESSURE: 78 MMHG | WEIGHT: 149.6 LBS

## 2022-12-09 DIAGNOSIS — Z91.89 10 YEAR RISK OF MI OR STROKE 7.5% OR GREATER: ICD-10-CM

## 2022-12-09 DIAGNOSIS — Z00.00 INITIAL MEDICARE ANNUAL WELLNESS VISIT: Primary | ICD-10-CM

## 2022-12-09 DIAGNOSIS — E78.00 PURE HYPERCHOLESTEROLEMIA: ICD-10-CM

## 2022-12-09 LAB
ALBUMIN SERPL-MCNC: 4.9 G/DL (ref 3.6–5.1)
ALBUMIN/GLOB SERPL: 2 {RATIO} (ref 1–2.5)
ALP SERPL-CCNC: 80 U/L (ref 33–130)
ALT 1742-6: 25 U/L (ref 0–32)
AST 1920-8: 18 U/L (ref 0–35)
BILIRUB SERPL-MCNC: 1 MG/DL (ref 0.2–1.2)
BUN SERPL-MCNC: 14 MG/DL (ref 7–25)
BUN/CREATININE RATIO: 15.6 (ref 6–22)
CALCIUM SERPL-MCNC: 10.3 MG/DL (ref 8.6–10.3)
CHLORIDE SERPLBLD-SCNC: 108 MMOL/L (ref 98–110)
CHOLEST SERPL-MCNC: 189 MG/DL (ref 0–199)
CHOLEST/HDLC SERPL: 2 {RATIO} (ref 0–5)
CO2 SERPL-SCNC: 27.6 MMOL/L (ref 20–32)
CREAT SERPL-MCNC: 0.9 MG/DL (ref 0.6–1.3)
GLOBULIN, CALCULATED - QUEST: 2.4 (ref 1.9–3.7)
GLUCOSE SERPL-MCNC: 101 MG/DL (ref 60–99)
HDLC SERPL-MCNC: 82 MG/DL (ref 40–150)
LDLC SERPL CALC-MCNC: 88 MG/DL (ref 0–130)
POTASSIUM SERPL-SCNC: 4.46 MMOL/L (ref 3.5–5.3)
PROT SERPL-MCNC: 7.3 G/DL (ref 6.1–8.1)
SODIUM SERPL-SCNC: 145 MMOL/L (ref 135–146)
TRIGL SERPL-MCNC: 93 MG/DL (ref 0–149)

## 2022-12-09 PROCEDURE — 36415 COLL VENOUS BLD VENIPUNCTURE: CPT | Performed by: FAMILY MEDICINE

## 2022-12-09 PROCEDURE — 80061 LIPID PANEL: CPT | Performed by: FAMILY MEDICINE

## 2022-12-09 PROCEDURE — 99213 OFFICE O/P EST LOW 20 MIN: CPT | Mod: 25 | Performed by: FAMILY MEDICINE

## 2022-12-09 PROCEDURE — 80053 COMPREHEN METABOLIC PANEL: CPT | Performed by: FAMILY MEDICINE

## 2022-12-09 PROCEDURE — G0438 PPPS, INITIAL VISIT: HCPCS | Performed by: FAMILY MEDICINE

## 2022-12-09 RX ORDER — ATORVASTATIN CALCIUM 10 MG/1
10 TABLET, FILM COATED ORAL DAILY
Qty: 90 TABLET | Refills: 0 | Status: SHIPPED | OUTPATIENT
Start: 2022-12-09 | End: 2023-02-09

## 2022-12-09 NOTE — PATIENT INSTRUCTIONS
Health Maintenance   Topic Date Due    FALL RISK ASSESSMENT  04/19/2023    MEDICARE ANNUAL WELLNESS VISIT  12/09/2023    MAMMO SCREENING  03/23/2024    ADVANCE CARE PLANNING  06/14/2027    LIPID  10/11/2027    DTAP/TDAP/TD IMMUNIZATION (4 - Td or Tdap) 10/15/2028    COLORECTAL CANCER SCREENING  08/24/2032    DEXA  12/01/2037    HEPATITIS C SCREENING  Completed    PHQ-2 (once per calendar year)  Completed    INFLUENZA VACCINE  Completed    Pneumococcal Vaccine: 65+ Years  Completed    ZOSTER IMMUNIZATION  Completed    COVID-19 Vaccine  Completed    IPV IMMUNIZATION  Aged Out    MENINGITIS IMMUNIZATION  Aged Out    LUNG CANCER SCREENING  Discontinued

## 2022-12-09 NOTE — NURSING NOTE
Chief Complaint   Patient presents with     Recheck Medication     Fasting today, recheck Atorvastatin was started on this 6 weeks ago      Pre-visit Screening:  Immunizations:  up to date  Colonoscopy:  is up to date  Mammogram: is up to date  Asthma Action Test/Plan:  NA  PHQ9:  NA  GAD7:  NA  Questioned patient about current smoking habits Pt. has never smoked.  Ok to leave detailed message on voice mail for today's visit only Yes, phone # [phone]

## 2022-12-09 NOTE — PROGRESS NOTES
Olga Hanley is a 72 year old female who presents for Medicare Annual Wellness Visit.    Current providers caring for this patient include:  Patient Care Team:  Raisa De Luna PA as PCP - General (Family Practice)  Raisa De Luna PA as Assigned PCP    Complete Medical and Social history reviewed with patient, outlined below.    Patient Active Problem List   Diagnosis     ACP (advance care planning)     Health Care Home     Age-related osteoporosis without current pathological fracture     History of thyroid cancer     Pure hypercholesterolemia     10 year risk of MI or stroke 7.5% or greater       Past Medical History:   Diagnosis Date     Cataract 2011     Utility update for deleted IMO code [employer] Update utility     Heart murmur 2003     Malignant neoplasm of thyroid gland (H)        Past Surgical History:   Procedure Laterality Date     CATARACT IOL, RT/LT  2011    left     CATARACT IOL, RT/LT Right 2019     COLONOSCOPY  2006    no immediate complication, diverticulitis-sigmoid/ Dr Sameer Pearl     THYROIDECTOMY Right 2016    Procedure: THYROIDECTOMY;  Surgeon: Prudence Broussard MD;  Location: [facility] OR     THYROIDECTOMY Left 2016    Procedure: THYROIDECTOMY;  Surgeon: Prudence Broussard MD;  Location: [facility] OR     Z APPENDECTOMY  1998    incidental with hysterectomy     Z NONSPECIFIC PROCEDURE  2003    cystocele, rectocele, repair Dr Escobar DOUGLAS TOTAL ABDOM HYSTERECTOMY  1988    left oophorectomy; abnormal pap smears       Family History   Problem Relation Age of Onset     Hyperlipidemia Mother         diagnosed in her 70's     Hypertension Mother      Atrial fibrillation Mother      Other - See Comments Brother         Roman's  at 10     Cancer Maternal Aunt 70        breast cancer       Social History     Tobacco Use     Smoking status: Former     Smokeless tobacco: Never     Tobacco comments:     smoking in 20s only  "  Substance Use Topics     Alcohol use: Yes     Alcohol/week: 7.0 standard drinks     Types: 7 Standard drinks or equivalent per week       Diet: regular, low salt/low fat  Physical Activity: active without specific exercise program, enjoys walking  Depression Screen:    Over the past 2 weeks, patient has felt down, depressed, or hopeless:  No    Over the past 2 weeks, patient has felt little interest or pleasure in doing things: No    Functional ability/Safety screen:  Up and go test (able to get up and walk longer than 30 seconds): Passed  Patient needs assistance with: nothing  Patient's home has the following possible safety concerns:  nothing  Patient has concerns about her hearing:  No  Cognitive Screen  Patient repeats three objects (ball, flag, tree)      Clock drawing test:NORMAL  Recalls three objects after 3 minutes (ball,flag,tree):                               recalls 3 objects (3 points)    Physical Exam:  /78 (BP Location: Right arm, Patient Position: Sitting, Cuff Size: Adult Large)   Pulse 79   Temp 98.5  F (36.9  C) (Temporal)   Ht 1.676 m (5' 6\")   Wt 67.9 kg (149 lb 9.6 oz)   LMP  (LMP Unknown)   SpO2 98%   BMI 24.15 kg/m     Body mass index is 24.15 kg/m .     Health Maintenance   Topic Date Due     MEDICARE ANNUAL WELLNESS VISIT  10/21/2021     FALL RISK ASSESSMENT  04/19/2023     MAMMO SCREENING  03/23/2024     ADVANCE CARE PLANNING  06/14/2027     LIPID  10/11/2027     DTAP/TDAP/TD IMMUNIZATION (4 - Td or Tdap) 10/15/2028     COLORECTAL CANCER SCREENING  08/24/2032     DEXA  12/01/2037     HEPATITIS C SCREENING  Completed     PHQ-2 (once per calendar year)  Completed     INFLUENZA VACCINE  Completed     Pneumococcal Vaccine: 65+ Years  Completed     ZOSTER IMMUNIZATION  Completed     COVID-19 Vaccine  Completed     IPV IMMUNIZATION  Aged Out     MENINGITIS IMMUNIZATION  Aged Out     LUNG CANCER SCREENING  Discontinued             End of Life Planning:   Patient currently has an " advanced directive: Yes.  Practitioner is supportive of decision.    Education/Counseling:   Based on review of the above information, the following items were addressed:      Healthy diet and regular exercise    Appropriate preventive services were discussed with this patient, including applicable screening as appropriate for cardiovascular disease, diabetes, osteopenia/osteoporosis, and glaucoma.  As appropriate for age/gender, discussed screening for colorectal cancer, prostate cancer, breast cancer, and cervical cancer.   Checklist reviewing preventive services available has been given to the patient.

## 2022-12-09 NOTE — LETTER
December 9, 2022      Olga BELEM Hanley  [address] [place] [id number]        Dear ,    We are writing to inform you of your test results.    Your labs were ok: lipids, kidney and liver function    Resulted Orders   Lipid Panel (BFP)   Result Value Ref Range    Cholesterol 189 0 - 199 mg/dL    Triglycerides 93 0 - 149 mg/dL    HDL Cholesterol 82 40 - 150 mg/dL    LDL Cholesterol Direct 88 0 - 130 mg/dL    Cholesterol/HDL Ratio 2 0 - 5   Comprehensive Metobolic Panel (BFP)   Result Value Ref Range    Carbon Dioxide 27.6 20 - 32 mmol/L    Creatinine 0.90 0.60 - 1.30 mg/dL    Glucose 101 (A) 60 - 99 mg/dL    Sodium 145.0 135 - 146 mmol/L    Potassium 4.46 3.5 - 5.3 mmol/L    Chloride 108.0 98 - 110 mmol/L    Protein Total 7.3 6.1 - 8.1 g/dL    Albumin 4.9 3.6 - 5.1 g/dL    Alkaline Phosphatase 80 33 - 130 U/L    ALT 25 0 - 32 U/L    AST 18 0 - 35 U/L    Bilirubin Total 1.0 0.2 - 1.2 mg/dL    Urea Nitrogen 14 7 - 25 mg/dL    Calcium 10.3 8.6 - 10.3 mg/dL    BUN/Creatinine Ratio 15.6 6 - 22    Globulin Calculated 2.4 1.9 - 3.7    A/G Ratio 2.0 1 - 2.5       If you have any questions or concerns, please call the clinic at the number listed above.       Sincerely,      Melody Díaz MD

## 2023-02-07 DIAGNOSIS — E78.00 PURE HYPERCHOLESTEROLEMIA: ICD-10-CM

## 2023-02-07 DIAGNOSIS — Z91.89 10 YEAR RISK OF MI OR STROKE 7.5% OR GREATER: ICD-10-CM

## 2023-02-07 RX ORDER — ATORVASTATIN CALCIUM 10 MG/1
TABLET, FILM COATED ORAL
Qty: 90 TABLET | Refills: 0 | COMMUNITY
Start: 2023-02-07

## 2023-02-07 NOTE — TELEPHONE ENCOUNTER
Olga Hanley is requesting a refill of:    Refused Prescriptions:                       Disp   Refills    atorvastatin (LIPITOR) 10 MG tablet [Pharm*90 tab*0        Sig: TAKE 1 TABLET(10 MG) BY MOUTH DAILY  Refused By: MICHELLE MORRIS  Reason for Refusal: Should already have refills on file    Sent 90 on 12/9/22

## 2023-02-09 DIAGNOSIS — E78.00 PURE HYPERCHOLESTEROLEMIA: ICD-10-CM

## 2023-02-09 DIAGNOSIS — Z91.89 10 YEAR RISK OF MI OR STROKE 7.5% OR GREATER: ICD-10-CM

## 2023-02-09 RX ORDER — ATORVASTATIN CALCIUM 10 MG/1
TABLET, FILM COATED ORAL
Qty: 90 TABLET | Refills: 0 | COMMUNITY
Start: 2023-02-09

## 2023-02-09 NOTE — TELEPHONE ENCOUNTER
Olga Hanley is requesting a refill of:    Refused Prescriptions:                       Disp   Refills    atorvastatin (LIPITOR) 10 MG tablet [Pharm*90 tab*0        Sig: TAKE 1 TABLET(10 MG) BY MOUTH DAILY  Refused By: ROXANNE LIMA  Reason for Refusal: Patient has requested refill too soon    Refill sent on 12/09/22 for 90 tablets, that is enough medication until march

## 2023-02-09 NOTE — TELEPHONE ENCOUNTER
Please send RX, pt last seen 12/09/22 cholesterol was in range. Pharmacy shorted her on pills, this is why she is requesting earlier.    Olga Hanley is requesting a refill of:    Pending Prescriptions:                       Disp   Refills    atorvastatin (LIPITOR) 10 MG tablet       90 tab*2            Sig: Take 1 tablet (10 mg) by mouth daily  Refused Prescriptions:                       Disp   Refills    atorvastatin (LIPITOR) 10 MG tablet [Pharm*90 tab*0        Sig: TAKE 1 TABLET(10 MG) BY MOUTH DAILY  Refused By: ROXANNE LIMA  Reason for Refusal: Patient has requested refill too soon

## 2023-02-10 RX ORDER — ATORVASTATIN CALCIUM 10 MG/1
10 TABLET, FILM COATED ORAL DAILY
Qty: 90 TABLET | Refills: 2 | Status: SHIPPED | OUTPATIENT
Start: 2023-02-10 | End: 2023-11-08

## 2023-04-07 ENCOUNTER — OFFICE VISIT (OUTPATIENT)
Dept: FAMILY MEDICINE | Facility: CLINIC | Age: 73
End: 2023-04-07

## 2023-04-07 VITALS
HEART RATE: 97 BPM | TEMPERATURE: 98.4 F | SYSTOLIC BLOOD PRESSURE: 122 MMHG | WEIGHT: 152.2 LBS | HEIGHT: 66 IN | OXYGEN SATURATION: 99 % | DIASTOLIC BLOOD PRESSURE: 76 MMHG | BODY MASS INDEX: 24.46 KG/M2

## 2023-04-07 DIAGNOSIS — L98.9 SKIN LESION: Primary | ICD-10-CM

## 2023-04-07 PROCEDURE — 99213 OFFICE O/P EST LOW 20 MIN: CPT | Performed by: PHYSICIAN ASSISTANT

## 2023-04-07 NOTE — NURSING NOTE
Chief Complaint   Patient presents with     Derm Problem     Small raised bump on the back of her right calf, noticed 1 week ago and has tried OTC cream, gets dry and crusty, irritated when rubbing on her pants      Pre-visit Screening:  Immunizations:  up to date  Colonoscopy:  is up to date  Mammogram: is up to date  Asthma Action Test/Plan:  NA  PHQ9:  NA  GAD7:  NA  Questioned patient about current smoking habits Pt. has never smoked.  Ok to leave detailed message on voice mail for today's visit only Yes, phone # [phone]

## 2023-04-07 NOTE — PROGRESS NOTES
"  Assessment & Plan     Skin lesion-suspect BCC given appearance, Will refer to dermatology for management    - Adult Dermatology Referral         Follow up with Dermatology    No follow-ups on file.    Seth Gardner PA-C  [facility]    Jessica Espinoza is a 72 year old, presenting for the following health issues:  Derm Problem (Small raised bump on the back of her right calf, noticed 1 week ago and has tried OTC cream, gets dry and crusty, irritated when rubbing on her pants )         View : No data to display.              HPI     Skin lesion popped up suddenly-shaved over this and grew in size.     Skin Lesion  Onset/Duration: 2-3 weeks  Description  Location: R posterior leg  Color: skin colored  Border description: raised, pearly edges, scaly, regular border, round  Character: raised, flakey, blanches to palpitation  Itching: no  Bleeding:  No  Intensity:  mild  Progression of Symptoms:  worsening  Accompanying signs and symptoms:   Bleeding: No  Scaling: YES  Excessive sun exposure/tanning: No  Sunscreen used: YES  History:           Any previous history of skin cancer: No  Any family history of melanoma: No  Previous episodes of similar lesion: No  Precipitating or alleviating factors: NA  Therapies tried and outcome: hydrocortisone-no improvement, [id number] improvement      Review of Systems   Constitutional, HEENT, cardiovascular, pulmonary, gi and gu systems are negative, except as otherwise noted.      Objective    /76 (BP Location: Left arm, Patient Position: Sitting, Cuff Size: Adult Regular)   Pulse 97   Temp 98.4  F (36.9  C) (Temporal)   Ht 1.676 m (5' 6\")   Wt 69 kg (152 lb 3.2 oz)   LMP  (LMP Unknown)   SpO2 99%   BMI 24.57 kg/m    Body mass index is 24.57 kg/m .  Physical Exam   GENERAL: healthy, alert and no distress  NECK: no adenopathy, no asymmetry, masses, or scars and thyroid normal to palpation  RESP: lungs clear to auscultation - no rales, rhonchi or " wheezes  CV: regular rate and rhythm, normal S1 S2, no S3 or S4, no murmur, click or rub, no peripheral edema and peripheral pulses strong  ABDOMEN: soft, nontender, no hepatosplenomegaly, no masses and bowel sounds normal  MS: no gross musculoskeletal defects noted, no edema  SKIN: R calf: 1cm round lesion, regular raised border, flaking at top, skin colored to pearly colored

## 2023-11-08 DIAGNOSIS — Z91.89 10 YEAR RISK OF MI OR STROKE 7.5% OR GREATER: ICD-10-CM

## 2023-11-08 DIAGNOSIS — E78.00 PURE HYPERCHOLESTEROLEMIA: ICD-10-CM

## 2023-11-08 RX ORDER — ATORVASTATIN CALCIUM 10 MG/1
10 TABLET, FILM COATED ORAL DAILY
Qty: 7 TABLET | Refills: 0 | Status: SHIPPED | OUTPATIENT
Start: 2023-11-08 | End: 2023-11-14

## 2023-11-08 NOTE — TELEPHONE ENCOUNTER
Olga Hanley is requesting a refill of:    Pending Prescriptions:                       Disp   Refills    atorvastatin (LIPITOR) 10 MG tablet       7 tabl*0            Sig: Take 1 tablet (10 mg) by mouth daily    Please close encounter if RX was sent. Thanks, Risa Mcnulty has OV on 11/14/23

## 2023-11-08 NOTE — PROGRESS NOTES
Assessment & Plan   Problem List Items Addressed This Visit          Endocrine    Pure hypercholesterolemia    Relevant Medications    atorvastatin (LIPITOR) 10 MG tablet    Other Relevant Orders    VENOUS COLLECTION (Completed)    Lipid Panel (BFP)    Comprehensive Metobolic Panel (BFP)       Other    10 year risk of MI or stroke 7.5% or greater    Relevant Medications    atorvastatin (LIPITOR) 10 MG tablet      1. Pure hypercholesterolemia  Check labs, she is doing well on medications, refilled.  - atorvastatin (LIPITOR) 10 MG tablet; Take 1 tablet (10 mg) by mouth daily  Dispense: 90 tablet; Refill: 3  - VENOUS COLLECTION  - Lipid Panel (BFP)  - Comprehensive Metobolic Panel (BFP)    2. 10 year risk of MI or stroke 7.5% or greater    - atorvastatin (LIPITOR) 10 MG tablet; Take 1 tablet (10 mg) by mouth daily  Dispense: 90 tablet; Refill: 3              FUTURE APPOINTMENTS:       - Follow-up visit in 12 mo. Come in December for wellness.    No follow-ups on file.    Melody Díaz MD  [facility]    Subjective     Nursing Notes:   Delaney Becerra CMA  11/14/2023  8:47 AM  Signed  Chief Complaint   Patient presents with    Recheck Medication     Fasting today     Pre-visit Screening:  Immunizations:  up to date  Colonoscopy:  is up to date  Mammogram: is up to date  Asthma Action Test/Plan:  NA  PHQ9:  NA  GAD7:  NA  Questioned patient about current smoking habits Pt. has never smoked.  Ok to leave detailed message on voice mail for today's visit only Yes, phone # [phone]       Olga Hanley is a 72 year old female who presents to clinic today for the following health issues   HPI     Here to followup on her cholesterol medications. She is doing well on medications. Due for fasting labs and refills.        Review of Systems   Constitutional, HEENT, cardiovascular, pulmonary, gi and gu systems are negative, except as otherwise noted.      Objective    LMP  (LMP Unknown)   There is no  height or weight on file to calculate BMI.  Physical Exam   GENERAL: healthy, alert and no distress  RESP: lungs clear to auscultation - no rales, rhonchi or wheezes  CV: regular rate and rhythm, normal S1 S2, no S3 or S4, no murmur, click or rub, no peripheral edema and peripheral pulses strong  MS: no gross musculoskeletal defects noted, no edema  NEURO: Normal strength and tone, mentation intact and speech normal  PSYCH: mentation appears normal, affect normal/bright    No results found for any visits on 11/14/23.

## 2023-11-14 ENCOUNTER — OFFICE VISIT (OUTPATIENT)
Dept: FAMILY MEDICINE | Facility: CLINIC | Age: 73
End: 2023-11-14

## 2023-11-14 DIAGNOSIS — Z91.89 10 YEAR RISK OF MI OR STROKE 7.5% OR GREATER: ICD-10-CM

## 2023-11-14 DIAGNOSIS — E78.00 PURE HYPERCHOLESTEROLEMIA: ICD-10-CM

## 2023-11-14 LAB
ALBUMIN SERPL-MCNC: 4.6 G/DL (ref 3.6–5.1)
ALBUMIN/GLOB SERPL: 2 {RATIO} (ref 1–2.5)
ALP SERPL-CCNC: 93 U/L (ref 33–130)
ALT 1742-6: 24 U/L (ref 0–32)
AST 1920-8: 19 U/L (ref 0–35)
BILIRUB SERPL-MCNC: 1.1 MG/DL (ref 0.2–1.2)
BUN SERPL-MCNC: 11 MG/DL (ref 7–25)
BUN/CREATININE RATIO: 15.5 (ref 6–32)
CALCIUM SERPL-MCNC: 9.3 MG/DL (ref 8.6–10.3)
CHLORIDE SERPLBLD-SCNC: 104.3 MMOL/L (ref 98–110)
CHOLEST SERPL-MCNC: 174 MG/DL (ref 0–199)
CHOLEST/HDLC SERPL: 2 {RATIO} (ref 0–5)
CO2 SERPL-SCNC: 25.1 MMOL/L (ref 20–32)
CREAT SERPL-MCNC: 0.71 MG/DL (ref 0.6–1.3)
GLOBULIN, CALCULATED - QUEST: 2.3 (ref 1.9–3.7)
GLUCOSE SERPL-MCNC: 96 MG/DL (ref 60–99)
HDLC SERPL-MCNC: 80 MG/DL (ref 40–150)
LDLC SERPL CALC-MCNC: 76 MG/DL (ref 0–130)
POTASSIUM SERPL-SCNC: 4.11 MMOL/L (ref 3.5–5.3)
PROT SERPL-MCNC: 6.9 G/DL (ref 6.1–8.1)
SODIUM SERPL-SCNC: 139.9 MMOL/L (ref 135–146)
TRIGL SERPL-MCNC: 89 MG/DL (ref 0–149)

## 2023-11-14 PROCEDURE — 99213 OFFICE O/P EST LOW 20 MIN: CPT | Performed by: FAMILY MEDICINE

## 2023-11-14 PROCEDURE — 80053 COMPREHEN METABOLIC PANEL: CPT | Performed by: FAMILY MEDICINE

## 2023-11-14 PROCEDURE — 80061 LIPID PANEL: CPT | Performed by: FAMILY MEDICINE

## 2023-11-14 PROCEDURE — 36415 COLL VENOUS BLD VENIPUNCTURE: CPT | Performed by: FAMILY MEDICINE

## 2023-11-14 RX ORDER — ATORVASTATIN CALCIUM 10 MG/1
10 TABLET, FILM COATED ORAL DAILY
Qty: 90 TABLET | Refills: 3 | Status: SHIPPED | OUTPATIENT
Start: 2023-11-14

## 2023-11-14 NOTE — NURSING NOTE
Chief Complaint   Patient presents with    Recheck Medication     Fasting today     Pre-visit Screening:  Immunizations:  up to date  Colonoscopy:  is up to date  Mammogram: is up to date  Asthma Action Test/Plan:  NA  PHQ9:  NA  GAD7:  NA  Questioned patient about current smoking habits Pt. has never smoked.  Ok to leave detailed message on voice mail for today's visit only Yes, phone # [phone]

## 2023-11-14 NOTE — LETTER
November 14, 2023      Olga BELEM Hanley  [address] [place] [id number]        Dear ,    We are writing to inform you of your test results.      Your labs were all good    Resulted Orders   Lipid Panel (BFP)   Result Value Ref Range    Cholesterol 174 0 - 199 mg/dL    Triglycerides 89 0 - 149 mg/dL    HDL Cholesterol 80 40 - 150 mg/dL    LDL Cholesterol Direct 76 0 - 130 mg/dL    Cholesterol/HDL Ratio 2 0 - 5   Comprehensive Metobolic Panel (BFP)   Result Value Ref Range    Carbon Dioxide 25.1 20 - 32 mmol/L    Creatinine 0.71 0.60 - 1.30 mg/dL    Glucose 96 60 - 99 mg/dL    Sodium 139.9 135 - 146 mmol/L    Potassium 4.11 3.5 - 5.3 mmol/L    Chloride 104.3 98 - 110 mmol/L    Protein Total 6.9 6.1 - 8.1 g/dL    Albumin 4.6 3.6 - 5.1 g/dL    Alkaline Phosphatase 93 33 - 130 U/L    ALT 24 0 - 32 U/L    AST 19 0 - 35 U/L    Bilirubin Total 1.1 0.2 - 1.2 mg/dL    Urea Nitrogen 11 7 - 25 mg/dL    Calcium 9.3 8.6 - 10.3 mg/dL    BUN/Creatinine Ratio 15.5 6 - 32    Globulin Calculated 2.3 1.9 - 3.7    A/G Ratio 2.0 1 - 2.5       If you have any questions or concerns, please call the clinic at the number listed above.       Sincerely,      Melody Díaz MD

## 2024-04-05 NOTE — PATIENT INSTRUCTIONS
Three dairy products a day   (other sources of calcium almonds, red and white beans, kale)    Take 1000 IU of vitamin D3    Stop calcium supplements   None

## 2024-04-19 NOTE — PROGRESS NOTES
English Preventive Care Visit  [facility]  Melody Díaz MD, Family Medicine  Apr 23, 2024       SUBJECTIVE:   Olga is a 73 year old, presenting for the following:  Physical (Non-fasting today )      HPI      Here for her physical and wellness.              Social History     Tobacco Use    Smoking status: Former     Types: Cigarettes     Passive exposure: Past    Smokeless tobacco: Never    Tobacco comments:     smoking in 20s only   Substance Use Topics    Alcohol use: Not Currently              No data to display            No concerns  Reviewed orders with patient.  Reviewed health maintenance and updated orders accordingly - Yes  BP Readings from Last 3 Encounters:   04/23/24 120/76   04/07/23 122/76   12/09/22 126/78    Wt Readings from Last 3 Encounters:   04/23/24 65.3 kg (144 lb)   04/07/23 69 kg (152 lb 3.2 oz)   12/09/22 67.9 kg (149 lb 9.6 oz)                  Patient Active Problem List   Diagnosis    ACP (advance care planning)    Health Care Home    Age-related osteoporosis without current pathological fracture--followed by endocrinology    History of thyroid cancer--followed by endocrinology    Pure hypercholesterolemia    10 year risk of MI or stroke 7.5% or greater     Past Surgical History:   Procedure Laterality Date    CATARACT IOL, RT/LT  04/01/2011    left    CATARACT IOL, RT/LT Right 2019    THYROIDECTOMY Right 08/23/2016    Procedure: THYROIDECTOMY;  Surgeon: Prudence Broussard MD;  Location: [facility] OR    THYROIDECTOMY Left 09/20/2016    Procedure: THYROIDECTOMY;  Surgeon: Prudence Broussard MD;  Location:  OR    [facility] APPENDECTOMY  01/01/1998    incidental with hysterectomy    [facility] NONSPECIFIC PROCEDURE  03/01/2003    cystocele, rectocele, repair Dr Cody    [facility] TOTAL ABDOM HYSTERECTOMY  01/01/1988    left oophorectomy; abnormal pap smears       Social History     Tobacco Use    Smoking status: Former     Types: Cigarettes     Passive exposure: Past    Smokeless tobacco:  Never    Tobacco comments:     smoking in 20s only   Substance Use Topics    Alcohol use: Not Currently     Family History   Problem Relation Age of Onset    Hyperlipidemia Mother         diagnosed in her 70's    Hypertension Mother     Atrial fibrillation Mother     Other - See Comments Brother         Turner  at 10    Cancer Maternal Aunt 70        breast cancer         Current Outpatient Medications   Medication Sig Dispense Refill    alendronate (FOSAMAX) 70 MG tablet Take 1 tablet (70 mg) by mouth every 7 days  3    atorvastatin (LIPITOR) 10 MG tablet Take 1 tablet (10 mg) by mouth daily 90 tablet 3    levothyroxine (SYNTHROID/LEVOTHROID) 100 MCG tablet Take 1 tablet (100 mcg) by mouth daily 30 tablet     loratadine (CLARITIN) 10 MG tablet Take 1 tablet (10 mg) by mouth daily      vitamin D3 (CHOLECALCIFEROL) 1000 units (25 mcg) tablet Take by mouth daily 30 tablet        Breast Cancer Screening:        History of abnormal Pap smear: declined pelvic exam     Reviewed and updated as needed this visit by clinical staff   Tobacco  Allergies  Meds              Reviewed and updated as needed this visit by Provider                  Past Medical History:   Diagnosis Date    Cataract 2011     Utility update for deleted IMO code [facility] Update utility    Heart murmur 2003    Malignant neoplasm of thyroid gland (H)       Past Surgical History:   Procedure Laterality Date    CATARACT IOL, RT/LT  2011    left    CATARACT IOL, RT/LT Right 2019    THYROIDECTOMY Right 2016    Procedure: THYROIDECTOMY;  Surgeon: Prudence Broussard MD;  Location: [facility] OR    THYROIDECTOMY Left 2016    Procedure: THYROIDECTOMY;  Surgeon: Prudence Broussard MD;  Location:  OR    [facility] APPENDECTOMY  1998    incidental with hysterectomy    [facility] NONSPECIFIC PROCEDURE  2003    cystocele, rectocele, repair Dr Cody    [facility] TOTAL ABDOM HYSTERECTOMY  1988    left oophorectomy; abnormal pap smears  "    Review of Systems    Review of Systems  Constitutional, HEENT, cardiovascular, pulmonary, gi and gu systems are negative, except as otherwise noted.    OBJECTIVE:   /76 (BP Location: Right arm, Patient Position: Sitting, Cuff Size: Adult Regular)   Pulse 74   Temp 97.8  F (36.6  C) (Temporal)   Ht 1.676 m (5' 6\")   Wt 65.3 kg (144 lb)   LMP  (LMP Unknown)   SpO2 97%   BMI 23.24 kg/m     Estimated body mass index is 23.24 kg/m  as calculated from the following:    Height as of this encounter: 1.676 m (5' 6\").    Weight as of this encounter: 65.3 kg (144 lb).  Physical Exam  GENERAL: alert and no distress  EYES: Eyes grossly normal to inspection, PERRL and conjunctivae and sclerae normal  HENT: ear canals and TM's normal, nose and mouth without ulcers or lesions  NECK: no adenopathy, no asymmetry, masses, or scars  RESP: lungs clear to auscultation - no rales, rhonchi or wheezes  CV: regular rate and rhythm, normal S1 S2, no S3 or S4, no murmur, click or rub, no peripheral edema  ABDOMEN: soft, nontender, no hepatosplenomegaly, no masses and bowel sounds normal  MS: no gross musculoskeletal defects noted, no edema  SKIN: no suspicious lesions or rashes  NEURO: Normal strength and tone, mentation intact and speech normal  PSYCH: mentation appears normal, affect normal/bright  Declined breast and pelvic exams.    Diagnostic Test Results:  Labs reviewed in Epic  No results found for any visits on 04/23/24.    ASSESSMENT/PLAN:   1. Encounter for routine history and physical exam in female patient      2. Medicare annual wellness visit, subsequent  Completed.     Patient has been advised of split billing requirements and indicates understanding: Yes      Counseling  Reviewed preventive health counseling, as reflected in patient instructions       Regular exercise       Healthy diet/nutrition        She reports that she has quit smoking. Her smoking use included cigarettes. She has been exposed to tobacco " smoke. She has never used smokeless tobacco.          Signed Electronically by: Melody Díaz MD

## 2024-04-23 ENCOUNTER — OFFICE VISIT (OUTPATIENT)
Dept: FAMILY MEDICINE | Facility: CLINIC | Age: 74
End: 2024-04-23

## 2024-04-23 VITALS
OXYGEN SATURATION: 97 % | HEART RATE: 74 BPM | DIASTOLIC BLOOD PRESSURE: 76 MMHG | TEMPERATURE: 97.8 F | BODY MASS INDEX: 23.14 KG/M2 | HEIGHT: 66 IN | SYSTOLIC BLOOD PRESSURE: 120 MMHG | WEIGHT: 144 LBS

## 2024-04-23 DIAGNOSIS — Z00.00 ENCOUNTER FOR ROUTINE HISTORY AND PHYSICAL EXAM IN FEMALE PATIENT: Primary | ICD-10-CM

## 2024-04-23 DIAGNOSIS — Z00.00 MEDICARE ANNUAL WELLNESS VISIT, SUBSEQUENT: ICD-10-CM

## 2024-04-23 PROCEDURE — 99387 INIT PM E/M NEW PAT 65+ YRS: CPT | Mod: 25 | Performed by: FAMILY MEDICINE

## 2024-04-23 PROCEDURE — G0439 PPPS, SUBSEQ VISIT: HCPCS | Performed by: FAMILY MEDICINE

## 2024-04-23 NOTE — PROGRESS NOTES
Olga Hanley is a 73 year old female who presents for Medicare Annual Wellness Visit.    Current providers caring for this patient include:  Patient Care Team:  Seth Gardner PA-C as PCP - General (Family Medicine)  Melody Díaz MD as Assigned PCP    Complete Medical and Social history reviewed with patient, outlined below.    Patient Active Problem List   Diagnosis    ACP (advance care planning)    Health Care Home    Age-related osteoporosis without current pathological fracture--followed by endocrinology    History of thyroid cancer--followed by endocrinology    Pure hypercholesterolemia    10 year risk of MI or stroke 7.5% or greater       Past Medical History:   Diagnosis Date    Cataract 2011     Utility update for deleted IMO code [facility] Update utility    Heart murmur 2003    Malignant neoplasm of thyroid gland (H)        Past Surgical History:   Procedure Laterality Date    CATARACT IOL, RT/LT  2011    left    CATARACT IOL, RT/LT Right 2019    THYROIDECTOMY Right 2016    Procedure: THYROIDECTOMY;  Surgeon: Prudence Broussard MD;  Location: [facility] OR    THYROIDECTOMY Left 2016    Procedure: THYROIDECTOMY;  Surgeon: Prudence Broussard MD;  Location: [facility] OR    [facility] APPENDECTOMY  1998    incidental with hysterectomy    [facility] NONSPECIFIC PROCEDURE  2003    cystocele, rectocele, repair Dr Escobar [facility] TOTAL ABDOM HYSTERECTOMY  1988    left oophorectomy; abnormal pap smears       Family History   Problem Relation Age of Onset    Hyperlipidemia Mother         diagnosed in her 70's    Hypertension Mother     Atrial fibrillation Mother     Other - See Comments Brother         Roman's  at 10    Cancer Maternal Aunt 70        breast cancer       Social History     Tobacco Use    Smoking status: Former     Types: Cigarettes     Passive exposure: Past    Smokeless tobacco: Never    Tobacco comments:     smoking in 20s only   Substance Use Topics    Alcohol use:  "Not Currently       Diet: regular, low salt/low fat  Physical Activity: patient exercises 3 times weekly, exercise class at the [facility]  Depression Screen:    Over the past 2 weeks, patient has felt down, depressed, or hopeless:  No    Over the past 2 weeks, patient has felt little interest or pleasure in doing things: No    Functional ability/Safety screen:  Up and go test (able to get up and walk longer than 30 seconds): Passed  Patient needs assistance with: nothing  Patient's home has the following possible safety concerns: none identified  Patient has concerns about her hearing:  No  Cognitive Screen  Patient repeats three objects (ball, flag, tree)      Clock drawing test:   NORMAL  Recalls three objects after 3 minutes (ball,flag,tree):      recalls 3 objects (3 points)    Physical Exam:  /76 (BP Location: Right arm, Patient Position: Sitting, Cuff Size: Adult Regular)   Pulse 74   Temp 97.8  F (36.6  C) (Temporal)   Ht 1.676 m (5' 6\")   Wt 65.3 kg (144 lb)   LMP  (LMP Unknown)   SpO2 97%   BMI 23.24 kg/m     Body mass index is 23.24 kg/m .        Health Maintenance   Topic Date Due    MEDICARE ANNUAL WELLNESS VISIT  12/09/2023    COVID-19 Vaccine (6 - 2023-24 season) 03/04/2024    LIPID  11/14/2024    MAMMO SCREENING  04/19/2025    FALL RISK ASSESSMENT  04/23/2025    GLUCOSE  11/14/2026    ADVANCE CARE PLANNING  06/14/2027    DTAP/TDAP/TD IMMUNIZATION (3 - Td or Tdap) 10/15/2028    COLORECTAL CANCER SCREENING  08/24/2032    DEXA  12/01/2037    HEPATITIS C SCREENING  Completed    PHQ-2 (once per calendar year)  Completed    INFLUENZA VACCINE  Completed    Pneumococcal Vaccine: 65+ Years  Completed    ZOSTER IMMUNIZATION  Completed    RSV VACCINE (Pregnancy & 60+)  Completed    IPV IMMUNIZATION  Aged Out    HPV IMMUNIZATION  Aged Out    MENINGITIS IMMUNIZATION  Aged Out    RSV MONOCLONAL ANTIBODY  Aged Out    TSH W/FREE T4 REFLEX  Discontinued    LUNG CANCER SCREENING  Discontinued "           End of Life Planning:   Patient currently has an advanced directive: Yes.  Practitioner is supportive of decision.    Education/Counseling:   Based on review of the above information, the following items were addressed:      Diet and exercise    Appropriate preventive services were discussed with this patient, including applicable screening as appropriate for cardiovascular disease, diabetes, osteopenia/osteoporosis, and glaucoma.  As appropriate for age/gender, discussed screening for colorectal cancer, prostate cancer, breast cancer, and cervical cancer.   Checklist reviewing preventive services available has been given to the patient.

## 2024-04-23 NOTE — NURSING NOTE
Chief Complaint   Patient presents with    Physical     Non-fasting today      Pre-visit Screening:  Immunizations:  up to date  Colonoscopy:  is up to date  Mammogram: is up to date- pt had this today will await records  Asthma Action Test/Plan:  NA  PHQ9:  NA  GAD7:  NA  Questioned patient about current smoking habits Pt. quit smoking some time ago.  Ok to leave detailed message on voice mail for today's visit only Yes, phone # [phone]

## 2024-04-23 NOTE — PATIENT INSTRUCTIONS
Health Maintenance   Topic Date Due    MEDICARE ANNUAL WELLNESS VISIT  12/09/2023    COVID-19 Vaccine (6 - 2023-24 season) 03/04/2024    LIPID  11/14/2024    MAMMO SCREENING  04/19/2025    FALL RISK ASSESSMENT  04/23/2025    GLUCOSE  11/14/2026    ADVANCE CARE PLANNING  06/14/2027    DTAP/TDAP/TD IMMUNIZATION (3 - Td or Tdap) 10/15/2028    COLORECTAL CANCER SCREENING  08/24/2032    DEXA  12/01/2037    HEPATITIS C SCREENING  Completed    PHQ-2 (once per calendar year)  Completed    INFLUENZA VACCINE  Completed    Pneumococcal Vaccine: 65+ Years  Completed    ZOSTER IMMUNIZATION  Completed    RSV VACCINE (Pregnancy & 60+)  Completed    IPV IMMUNIZATION  Aged Out    HPV IMMUNIZATION  Aged Out    MENINGITIS IMMUNIZATION  Aged Out    RSV MONOCLONAL ANTIBODY  Aged Out    TSH W/FREE T4 REFLEX  Discontinued    LUNG CANCER SCREENING  Discontinued

## 2024-05-20 ENCOUNTER — OFFICE VISIT (OUTPATIENT)
Dept: FAMILY MEDICINE | Facility: CLINIC | Age: 74
End: 2024-05-20

## 2024-05-20 VITALS
DIASTOLIC BLOOD PRESSURE: 70 MMHG | BODY MASS INDEX: 22.98 KG/M2 | HEART RATE: 72 BPM | SYSTOLIC BLOOD PRESSURE: 126 MMHG | RESPIRATION RATE: 20 BRPM | HEIGHT: 66 IN | WEIGHT: 143 LBS | TEMPERATURE: 97.1 F

## 2024-05-20 DIAGNOSIS — H00.011 HORDEOLUM EXTERNUM OF RIGHT UPPER EYELID: Primary | ICD-10-CM

## 2024-05-20 PROCEDURE — 99213 OFFICE O/P EST LOW 20 MIN: CPT | Performed by: FAMILY MEDICINE

## 2024-05-20 RX ORDER — POLYMYXIN B SULFATE AND TRIMETHOPRIM 1; 10000 MG/ML; [USP'U]/ML
1-2 SOLUTION OPHTHALMIC EVERY 4 HOURS
Qty: 5 ML | Refills: 0 | Status: SHIPPED | OUTPATIENT
Start: 2024-05-20

## 2024-05-20 NOTE — PROGRESS NOTES
SUBJECTIVE: Olga Hanley is a 73 year old female who complains of a right upper eyelid stye for 6 days. No fever, chills, no URI symptoms, no history of foreign body in the eye. Vision has been normal.    Patient Active Problem List   Diagnosis    ACP (advance care planning)    Health Care Home    Age-related osteoporosis without current pathological fracture--followed by endocrinology    History of thyroid cancer--followed by endocrinology    Pure hypercholesterolemia    10 year risk of MI or stroke 7.5% or greater       Past Medical History:   Diagnosis Date    Cataract 2011     Utility update for deleted IMO code [facility] Update utility    Heart murmur 2003    Malignant neoplasm of thyroid gland (H) 2016       Family History   Problem Relation Age of Onset    Hyperlipidemia Mother         diagnosed in her 70's    Hypertension Mother     Atrial fibrillation Mother     Other - See Comments Brother         Roman's  at 10    Cancer Maternal Aunt 70        breast cancer       Social History     Socioeconomic History    Marital status:      Spouse name: Calos    Number of children: 1    Years of education: 12    Highest education level: Not on file   Occupational History     Employer: [employer]     Comment:  at night time   Tobacco Use    Smoking status: Former     Types: Cigarettes     Passive exposure: Past    Smokeless tobacco: Never    Tobacco comments:     smoking in 20s only   Substance and Sexual Activity    Alcohol use: Not Currently    Drug use: No    Sexual activity: Yes     Comment:    Other Topics Concern     Service Not Asked    Blood Transfusions Not Asked    Caffeine Concern Not Asked    Occupational Exposure Not Asked    Hobby Hazards Not Asked    Sleep Concern Not Asked    Stress Concern Not Asked    Weight Concern Not Asked    Special Diet Not Asked    Back Care Not Asked    Exercise Yes    Bike Helmet Not Asked    Seat Belt Yes    Self-Exams  Yes   Social History Narrative    Not on file     Social Determinants of Health     Financial Resource Strain: Not on file   Food Insecurity: Not on file   Transportation Needs: Not on file   Physical Activity: Not on file   Stress: Not on file   Social Connections: Not on file   Interpersonal Safety: Not on file   Housing Stability: Not on file       Past Surgical History:   Procedure Laterality Date    CATARACT IOL, RT/LT  04/01/2011    left    CATARACT IOL, RT/LT Right 2019    THYROIDECTOMY Right 08/23/2016    Procedure: THYROIDECTOMY;  Surgeon: Prudence Broussard MD;  Location: [facility] OR    THYROIDECTOMY Left 09/20/2016    Procedure: THYROIDECTOMY;  Surgeon: Prudence Broussard MD;  Location: [facility] OR    [facility] APPENDECTOMY  01/01/1998    incidental with hysterectomy    [facility] NONSPECIFIC PROCEDURE  03/01/2003    cystocele, rectocele, repair Dr Cody    [facility] TOTAL ABDOM HYSTERECTOMY  01/01/1988    left oophorectomy; abnormal pap smears       Current Outpatient Medications   Medication Sig Dispense Refill    atorvastatin (LIPITOR) 10 MG tablet Take 1 tablet (10 mg) by mouth daily 90 tablet 3    levothyroxine (SYNTHROID/LEVOTHROID) 100 MCG tablet Take 1 tablet (100 mcg) by mouth daily 30 tablet     loratadine (CLARITIN) 10 MG tablet Take 1 tablet (10 mg) by mouth daily      vitamin D3 (CHOLECALCIFEROL) 1000 units (25 mcg) tablet Take by mouth daily 30 tablet     alendronate (FOSAMAX) 70 MG tablet Take 1 tablet (70 mg) by mouth every 7 days  3     No current facility-administered medications for this visit.        Allergies: Sulfa antibiotics      Immunization History   Administered Date(s) Administered    COVID-19 12+ (2023-24) (MODERNA) 11/04/2023    COVID-19 Bivalent 12+ (Pfizer) 10/24/2022    COVID-19 MONOVALENT 12+ (Pfizer) 03/06/2021, 03/27/2021, 10/11/2021    Influenza (IIV3) PF 11/12/2001, 11/29/2002, 11/17/2003, 10/15/2007, 09/30/2010, 10/03/2011, 10/09/2012, 09/30/2013, 10/09/2014, 10/06/2015    Influenza Vaccine 65+  (Fluzone HD) 10/03/2020, 10/22/2021, 10/11/2022, 10/09/2023    Influenza Vaccine >6 months,quad, PF 09/12/2015, 10/30/2017, 10/15/2018, 10/31/2019    Influenza, seasonal, injectable, PF 09/30/2010    Pneumo Conj 13-V (2010&after) 11/24/2017    Pneumococcal 23 valent 12/05/2018    RSV Vaccine (Arexvy) 10/09/2023    TD,PF 7+ (Tenivac) 12/25/1998, 10/15/2018    TDAP Vaccine (Adacel) 05/22/2008    Zoster recombinant adjuvanted (SHINGRIX) 01/22/2020, 07/29/2020    Zoster vaccine, live 03/04/2013        OBJECTIVE: She appears well, vitals are normal. Hordeolum noted right upper eyelid. DEJA, fundi normal. Visual acuity per Nurse's note. Ears, throat normal, no periorbital cellulitis, no neck lymphadenopathy.    ASSESSMENT: stye/hordeolum    PLAN: Frequent warm soaks, use antibiotic ophthalmic ointment as prescribed, and follow up if symptoms persist or worsen. It may take several days for this to resolve. Rarely, these persist or enlarge and in that event, she will need to see an Ophthalmologist. Patient agrees with the medical treatment plan.

## 2024-05-20 NOTE — NURSING NOTE
Olga Hanley is here for a possible stye on her right upper eyelid for the past week. Not painful, is using warm moist heat.

## 2024-11-14 DIAGNOSIS — Z91.89 10 YEAR RISK OF MI OR STROKE 7.5% OR GREATER: ICD-10-CM

## 2024-11-14 DIAGNOSIS — E78.00 PURE HYPERCHOLESTEROLEMIA: ICD-10-CM

## 2024-11-14 RX ORDER — ATORVASTATIN CALCIUM 10 MG/1
10 TABLET, FILM COATED ORAL DAILY
Qty: 7 TABLET | Refills: 0 | Status: SHIPPED | OUTPATIENT
Start: 2024-11-14

## 2024-11-14 NOTE — TELEPHONE ENCOUNTER
Pt scheduled appt for 11/19 needs extension of medication. Can you send for KEP?    Olga Hanley is requesting a refill of:    Pending Prescriptions:                       Disp   Refills    atorvastatin (LIPITOR) 10 MG tablet       7 tabl*0            Sig: Take 1 tablet (10 mg) by mouth daily.

## 2024-11-18 NOTE — PROGRESS NOTES
Assessment & Plan   Problem List Items Addressed This Visit       Pure hypercholesterolemia    Relevant Medications    atorvastatin (LIPITOR) 10 MG tablet    Other Relevant Orders    VENOUS COLLECTION (Completed)    Comprehensive Metobolic Panel (BFP)    Lipid Panel (BFP)    10 year risk of MI or stroke 7.5% or greater    Relevant Medications    atorvastatin (LIPITOR) 10 MG tablet     Other Visit Diagnoses       Other osteoporosis without current pathological fracture    -  Primary             1. Pure hypercholesterolemia  Check labs, refilled.   - atorvastatin (LIPITOR) 10 MG tablet; Take 1 tablet (10 mg) by mouth daily.  Dispense: 90 tablet; Refill: 1  - VENOUS COLLECTION  - Comprehensive Metobolic Panel (BFP)  - Lipid Panel (BFP)    2. 10 year risk of MI or stroke 7.5% or greater    - atorvastatin (LIPITOR) 10 MG tablet; Take 1 tablet (10 mg) by mouth daily.  Dispense: 90 tablet; Refill: 1    3. Other osteoporosis without current pathological fracture (Primary)  She sees endocrinology.            FUTURE APPOINTMENTS:       - Follow-up visit in 6 months for her wellness. We manage her chronic medical care.    No follow-ups on file.    Melody Díaz MD  [facility]    Subjective     Nursing Notes:   Delaney Becerra [facility]  11/19/2024  8:42 AM  Signed  Chief Complaint   Patient presents with    Recheck Medication     Fasting today, refill medications     Pre-visit Screening:  Immunizations:  up to date  Colonoscopy:  is up to date  Mammogram: is up to date  Asthma Action Test/Plan:  NA  PHQ9:  NA  GAD7:  NA  Questioned patient about current smoking habits Pt. quit smoking some time ago.  Ok to leave detailed message on voice mail for today's visit only Yes, phone # [phone]       Olga Hanley is a 74 year old female who presents to clinic today for the following health issues   HPI     Here to followup on her cholesterol medication. Is due for fasting labs and refills.  Followed by  endocrinology for osteoporosis.        Review of Systems   Constitutional, HEENT, cardiovascular, pulmonary, gi and gu systems are negative, except as otherwise noted.      Objective    /74 (BP Location: Right arm, Patient Position: Sitting, Cuff Size: Adult Large)   Pulse 71   Temp 97.7  F (36.5  C) (Temporal)   Wt 63 kg (139 lb)   LMP  (LMP Unknown)   SpO2 98%   BMI 22.44 kg/m    Body mass index is 22.44 kg/m .  Physical Exam   GENERAL: alert and no distress  RESP: lungs clear to auscultation - no rales, rhonchi or wheezes  CV: regular rate and rhythm, normal S1 S2, no S3 or S4, no murmur, click or rub, no peripheral edema  MS: no gross musculoskeletal defects noted, no edema  PSYCH: mentation appears normal, affect normal/bright    No results found for any visits on 11/19/24.

## 2024-11-19 ENCOUNTER — OFFICE VISIT (OUTPATIENT)
Dept: FAMILY MEDICINE | Facility: CLINIC | Age: 74
End: 2024-11-19

## 2024-11-19 VITALS
BODY MASS INDEX: 22.44 KG/M2 | TEMPERATURE: 97.7 F | HEART RATE: 71 BPM | SYSTOLIC BLOOD PRESSURE: 118 MMHG | DIASTOLIC BLOOD PRESSURE: 74 MMHG | OXYGEN SATURATION: 98 % | WEIGHT: 139 LBS

## 2024-11-19 DIAGNOSIS — Z91.89 10 YEAR RISK OF MI OR STROKE 7.5% OR GREATER: ICD-10-CM

## 2024-11-19 DIAGNOSIS — E78.00 PURE HYPERCHOLESTEROLEMIA: ICD-10-CM

## 2024-11-19 DIAGNOSIS — M81.8 OTHER OSTEOPOROSIS WITHOUT CURRENT PATHOLOGICAL FRACTURE: Primary | ICD-10-CM

## 2024-11-19 LAB
ALBUMIN SERPL-MCNC: 4.5 G/DL (ref 3.6–5.1)
ALP SERPL-CCNC: 81 U/L (ref 33–130)
ALT 1742-6: 15 U/L (ref 0–32)
AST 1920-8: 13 U/L (ref 0–35)
BILIRUB SERPL-MCNC: 1.2 MG/DL (ref 0.2–1.2)
BUN SERPL-MCNC: 11 MG/DL (ref 7–25)
BUN/CREATININE RATIO: 13 (ref 6–32)
CALCIUM SERPL-MCNC: 9.8 MG/DL (ref 8.6–10.3)
CHLORIDE SERPLBLD-SCNC: 105.1 MMOL/L (ref 98–110)
CHOLEST SERPL-MCNC: 167 MG/DL (ref 0–199)
CHOLEST/HDLC SERPL: 2 {RATIO} (ref 0–5)
CO2 SERPL-SCNC: 22.8 MMOL/L (ref 20–32)
CREAT SERPL-MCNC: 0.87 MG/DL (ref 0.6–1.3)
GLUCOSE SERPL-MCNC: 91 MG/DL (ref 60–99)
HDLC SERPL-MCNC: 81 MG/DL (ref 40–150)
LDLC SERPL CALC-MCNC: 74 MG/DL (ref 0–129)
POTASSIUM SERPL-SCNC: 4.15 MMOL/L (ref 3.5–5.3)
PROT SERPL-MCNC: 6.9 G/DL (ref 6.1–8.1)
SODIUM SERPL-SCNC: 140 MMOL/L (ref 135–146)
TRIGL SERPL-MCNC: 58 MG/DL (ref 0–149)

## 2024-11-19 RX ORDER — LEVOTHYROXINE SODIUM 88 UG/1
1 TABLET ORAL
COMMUNITY
Start: 2024-08-28

## 2024-11-19 RX ORDER — ATORVASTATIN CALCIUM 10 MG/1
10 TABLET, FILM COATED ORAL DAILY
Qty: 90 TABLET | Refills: 1 | Status: SHIPPED | OUTPATIENT
Start: 2024-11-19

## 2024-11-19 NOTE — NURSING NOTE
Chief Complaint   Patient presents with    Recheck Medication     Fasting today, refill medications     Pre-visit Screening:  Immunizations:  up to date  Colonoscopy:  is up to date  Mammogram: is up to date  Asthma Action Test/Plan:  NA  PHQ9:  NA  GAD7:  NA  Questioned patient about current smoking habits Pt. quit smoking some time ago.  Ok to leave detailed message on voice mail for today's visit only Yes, phone # [phone]

## 2024-11-19 NOTE — LETTER
November 20, 2024      Olgaramiro Hanley  [address] [place] [id number]        Dear ,    We are writing to inform you of your test results.    Your labs were all good  Resulted Orders   Comprehensive Metobolic Panel (BFP)   Result Value Ref Range    Carbon Dioxide 22.8 20 - 32 mmol/L    Creatinine 0.87 0.60 - 1.30 mg/dL    Glucose 91 60 - 99 mg/dL    Sodium 140.0 135 - 146 mmol/L    Potassium 4.15 3.5 - 5.3 mmol/L    Chloride 105.1 98 - 110 mmol/L    Protein Total 6.9 6.1 - 8.1 g/dL    Albumin 4.5 3.6 - 5.1 g/dL    Alkaline Phosphatase 81 33 - 130 U/L    ALT 15 0 - 32 U/L    AST 13 0 - 35 U/L    Bilirubin Total 1.2 0.2 - 1.2 mg/dL    Urea Nitrogen 11 7 - 25 mg/dL    Calcium 9.8 8.6 - 10.3 mg/dL    BUN/Creatinine Ratio 13 6 - 32   Lipid Panel (BFP)   Result Value Ref Range    Cholesterol 167 0 - 199 mg/dL    Triglycerides 58 0 - 149 mg/dL    HDL Cholesterol 81 40 - 150 mg/dL    LDL-C 74 0 - 129 mg/dL    Cholesterol/HDL Ratio 2 0 - 5       If you have any questions or concerns, please call the clinic at the number listed above.       Sincerely,      Melody Díaz MD

## 2024-12-16 ENCOUNTER — OFFICE VISIT (OUTPATIENT)
Dept: FAMILY MEDICINE | Facility: CLINIC | Age: 74
End: 2024-12-16

## 2024-12-16 VITALS
DIASTOLIC BLOOD PRESSURE: 74 MMHG | SYSTOLIC BLOOD PRESSURE: 118 MMHG | HEART RATE: 86 BPM | OXYGEN SATURATION: 97 % | TEMPERATURE: 97.7 F

## 2024-12-16 DIAGNOSIS — J32.9 OTHER SINUSITIS, UNSPECIFIED CHRONICITY: Primary | ICD-10-CM

## 2024-12-16 PROCEDURE — G2211 COMPLEX E/M VISIT ADD ON: HCPCS | Performed by: FAMILY MEDICINE

## 2024-12-16 PROCEDURE — 99213 OFFICE O/P EST LOW 20 MIN: CPT | Performed by: FAMILY MEDICINE

## 2024-12-16 NOTE — PROGRESS NOTES
Assessment & Plan   Problem List Items Addressed This Visit    None  Visit Diagnoses       Other sinusitis, unspecified chronicity    -  Primary             1. Other sinusitis, unspecified chronicity (Primary)  Sinusitis. Viral, continue to treat symptoms, discussed otc medications that may help            FUTURE APPOINTMENTS:       - Follow-up visit as needed. We manage her chronic medical care.    No follow-ups on file.    Melody Díaz MD  [facility]    Subjective     Nursing Notes:   Delaney Becerra CMA  12/16/2024 10:36 AM  Signed  Chief Complaint   Patient presents with    Sinus Problem     Sinus pressure pain on left side of her face, symptoms started on Thursday, pain now into her cheeks, ears, and forehead, green mucous when blowing her nose, she has been taking OTC sinus medicine     Pre-visit Screening:  Immunizations:  up to date  Colonoscopy:  is up to date  Mammogram: is up to date  Asthma Action Test/Plan:  NA  PHQ9:  NA  GAD7:  NA  Questioned patient about current smoking habits Pt. has never smoked.  Ok to leave detailed message on voice mail for today's visit only Yes, phone # [phone]       Olga Hanley is a 74 year old female who presents to clinic today for the following health issues   HPI     Here with sinus symptoms. Started last week on Thursday.  It has gotten worse. Has pain, took tylenol sinus. The pain is bad. No fevers and no shortness of breath. No cough. Just stuffiness in the face.  Doesn't want to check for covid.         Review of Systems   Constitutional, HEENT, cardiovascular, pulmonary, gi and gu systems are negative, except as otherwise noted.      Objective    /74 (BP Location: Right arm, Patient Position: Sitting, Cuff Size: Adult Large)   Pulse 86   Temp 97.7  F (36.5  C) (Temporal)   LMP  (LMP Unknown)   SpO2 97%   There is no height or weight on file to calculate BMI.  Physical Exam   GENERAL: alert and no distress  HENT: ear canals  and TM's normal, nose and mouth without ulcers or lesions  RESP: lungs clear to auscultation - no rales, rhonchi or wheezes  CV: regular rate and rhythm, normal S1 S2, no S3 or S4, no murmur, click or rub, no peripheral edema  MS: no gross musculoskeletal defects noted, no edema  PSYCH: mentation appears normal, affect normal/bright    No results found for any visits on 12/16/24.

## 2024-12-16 NOTE — NURSING NOTE
Chief Complaint   Patient presents with    Sinus Problem     Sinus pressure pain on left side of her face, symptoms started on Thursday, pain now into her cheeks, ears, and forehead, green mucous when blowing her nose, she has been taking OTC sinus medicine     Pre-visit Screening:  Immunizations:  up to date  Colonoscopy:  is up to date  Mammogram: is up to date  Asthma Action Test/Plan:  NA  PHQ9:  NA  GAD7:  NA  Questioned patient about current smoking habits Pt. has never smoked.  Ok to leave detailed message on voice mail for today's visit only Yes, phone # [phone]

## 2025-03-06 ENCOUNTER — APPOINTMENT (OUTPATIENT)
Dept: GENERAL RADIOLOGY | Facility: CLINIC | Age: 75
End: 2025-03-06
Attending: STUDENT IN AN ORGANIZED HEALTH CARE EDUCATION/TRAINING PROGRAM
Payer: COMMERCIAL

## 2025-03-06 ENCOUNTER — HOSPITAL ENCOUNTER (EMERGENCY)
Facility: CLINIC | Age: 75
Discharge: HOME | End: 2025-03-06
Attending: STUDENT IN AN ORGANIZED HEALTH CARE EDUCATION/TRAINING PROGRAM | Admitting: STUDENT IN AN ORGANIZED HEALTH CARE EDUCATION/TRAINING PROGRAM
Payer: COMMERCIAL

## 2025-03-06 VITALS
DIASTOLIC BLOOD PRESSURE: 80 MMHG | SYSTOLIC BLOOD PRESSURE: 136 MMHG | BODY MASS INDEX: 23.31 KG/M2 | RESPIRATION RATE: 16 BRPM | OXYGEN SATURATION: 98 % | WEIGHT: 144.4 LBS | HEART RATE: 90 BPM | TEMPERATURE: 97.7 F
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
## 2025-03-06 DIAGNOSIS — S60.221A CONTUSION OF RIGHT HAND, INITIAL ENCOUNTER: ICD-10-CM

## 2025-03-06 DIAGNOSIS — S82.431A CLOSED DISPLACED OBLIQUE FRACTURE OF SHAFT OF RIGHT FIBULA, INITIAL ENCOUNTER: ICD-10-CM

## 2025-03-06 DIAGNOSIS — S80.10XA CONTUSION OF MULTIPLE SITES OF LOWER EXTREMITY, UNSPECIFIED LATERALITY, INITIAL ENCOUNTER: ICD-10-CM

## 2025-03-06 DIAGNOSIS — R07.81 RIB PAIN ON RIGHT SIDE: ICD-10-CM

## 2025-03-06 PROCEDURE — 27780 TREATMENT OF FIBULA FRACTURE: CPT | Mod: RT

## 2025-03-06 PROCEDURE — 73590 X-RAY EXAM OF LOWER LEG: CPT | Mod: RT

## 2025-03-06 PROCEDURE — 73130 X-RAY EXAM OF HAND: CPT | Mod: RT

## 2025-03-06 PROCEDURE — 99285 EMERGENCY DEPT VISIT HI MDM: CPT | Mod: 25

## 2025-03-06 PROCEDURE — 71101 X-RAY EXAM UNILAT RIBS/CHEST: CPT | Mod: RT

## 2025-03-06 PROCEDURE — 73562 X-RAY EXAM OF KNEE 3: CPT | Mod: RT

## 2025-03-06 ASSESSMENT — ACTIVITIES OF DAILY LIVING (ADL)
ADLS_ACUITY_SCORE: 41
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
## 2025-03-06 NOTE — DISCHARGE INSTRUCTIONS
Take 1000 mg of Tylenol every ~6 hours for pain.  Do not exceed 4000 mg in 24 hours.  You can also take 600 mg of ibuprofen every 6-8 hours for pain.  Do not exceed 2400 mg in a 24-hour period and do not take this on an empty stomach  Ice your bruised areas  Schedule an appointment with orthopedics next week for recheck and ongoing management of your fracture  You can bear weight as tolerated  Return to the ER if you develop significant increase in pain or swelling, numbness, or further emergent concerns
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
## 2025-04-08 ENCOUNTER — TRANSFERRED RECORDS (OUTPATIENT)
Dept: FAMILY MEDICINE | Facility: CLINIC | Age: 75
End: 2025-04-08

## 2025-05-13 DIAGNOSIS — E78.00 PURE HYPERCHOLESTEROLEMIA: ICD-10-CM

## 2025-05-13 DIAGNOSIS — Z91.89 10 YEAR RISK OF MI OR STROKE 7.5% OR GREATER: ICD-10-CM

## 2025-05-13 RX ORDER — ATORVASTATIN CALCIUM 10 MG/1
10 TABLET, FILM COATED ORAL DAILY
Qty: 10 TABLET | Refills: 0 | Status: SHIPPED | OUTPATIENT
Start: 2025-05-13

## 2025-05-13 NOTE — TELEPHONE ENCOUNTER
Pt scheduled appt for 05/27.    Olga Hanley is requesting a refill of:    Pending Prescriptions:                       Disp   Refills    atorvastatin (LIPITOR) 10 MG tablet       10 tab*0            Sig: Take 1 tablet (10 mg) by mouth daily.

## 2025-05-19 NOTE — PROGRESS NOTES
Assessment & Plan   Problem List Items Addressed This Visit       Pure hypercholesterolemia    Relevant Medications    atorvastatin (LIPITOR) 10 MG tablet    Other Relevant Orders    VENOUS COLLECTION (Completed)    Comprehensive Metobolic Panel (BFP)    Lipid Panel (BFP)    10 year risk of MI or stroke 7.5% or greater    Relevant Medications    atorvastatin (LIPITOR) 10 MG tablet     Other Visit Diagnoses         Medicare annual wellness visit, subsequent    -  Primary           1. Medicare annual wellness visit, subsequent (Primary)  Completed.    2. Pure hypercholesterolemia  Check labs, refilled.  - atorvastatin (LIPITOR) 10 MG tablet; Take 1 tablet (10 mg) by mouth daily.  Dispense: 90 tablet; Refill: 1  - VENOUS COLLECTION  - Comprehensive Metobolic Panel (BFP)  - Lipid Panel (BFP)    3. 10 year risk of MI or stroke 7.5% or greater    - atorvastatin (LIPITOR) 10 MG tablet; Take 1 tablet (10 mg) by mouth daily.  Dispense: 90 tablet; Refill: 1              FUTURE APPOINTMENTS:       - Follow-up visit in 6 months. We manage her chronic medical care.    No follow-ups on file.    Melody Díaz MD  [facility]    Subjective     Nursing Notes:   Delaney Becerra [facility]  5/27/2025  8:54 AM  Signed  Chief Complaint   Patient presents with    Recheck Medication     Fasting today, refill medications     Pre-visit Screening:  Immunizations:  up to date  Colonoscopy:  is up to date  Mammogram: is up to date  Asthma Action Test/Plan:  NA  PHQ9:  NA  GAD7:  NA  Questioned patient about current smoking habits Pt. quit smoking some time ago.  Ok to leave detailed message on voice mail for today's visit only Yes, phone # [phone]       Olga Hanley is a 74 year old female who presents to clinic today for the following health issues HPI     Here for refill on cholesterol medications, she is doing well. Due for labs and refills.        Review of Systems   Constitutional, HEENT, cardiovascular,  "pulmonary, gi and gu systems are negative, except as otherwise noted.      Objective    /72 (BP Location: Right arm, Patient Position: Sitting, Cuff Size: Adult Regular)   Pulse 80   Temp 98.7  F (37.1  C) (Temporal)   Ht 1.689 m (5' 6.5\")   Wt 64.9 kg (143 lb)   LMP  (LMP Unknown)   SpO2 97%   BMI 22.74 kg/m    Body mass index is 22.74 kg/m .  Physical Exam   GENERAL: alert and no distress  RESP: lungs clear to auscultation - no rales, rhonchi or wheezes  CV: regular rate and rhythm, normal S1 S2, no S3 or S4, no murmur, click or rub, no peripheral edema  MS: no gross musculoskeletal defects noted, no edema  PSYCH: mentation appears normal, affect normal/bright    No results found for any visits on 05/27/25.      "

## 2025-05-27 ENCOUNTER — OFFICE VISIT (OUTPATIENT)
Dept: FAMILY MEDICINE | Facility: CLINIC | Age: 75
End: 2025-05-27

## 2025-05-27 VITALS
DIASTOLIC BLOOD PRESSURE: 72 MMHG | BODY MASS INDEX: 22.44 KG/M2 | TEMPERATURE: 98.7 F | SYSTOLIC BLOOD PRESSURE: 118 MMHG | HEIGHT: 67 IN | OXYGEN SATURATION: 97 % | WEIGHT: 143 LBS | HEART RATE: 80 BPM

## 2025-05-27 DIAGNOSIS — Z91.89 10 YEAR RISK OF MI OR STROKE 7.5% OR GREATER: ICD-10-CM

## 2025-05-27 DIAGNOSIS — E78.00 PURE HYPERCHOLESTEROLEMIA: ICD-10-CM

## 2025-05-27 DIAGNOSIS — Z00.00 MEDICARE ANNUAL WELLNESS VISIT, SUBSEQUENT: Primary | ICD-10-CM

## 2025-05-27 LAB
ALBUMIN SERPL-MCNC: 4.7 G/DL (ref 3.6–5.1)
ALP SERPL-CCNC: 89 U/L (ref 33–130)
ALT 1742-6: 14 U/L (ref 0–32)
AST 1920-8: 18 U/L (ref 0–35)
BILIRUB SERPL-MCNC: 1.1 MG/DL (ref 0.2–1.2)
BUN SERPL-MCNC: 10 MG/DL (ref 7–25)
BUN/CREATININE RATIO: 13 (ref 6–32)
CALCIUM SERPL-MCNC: 9.8 MG/DL (ref 8.6–10.3)
CHLORIDE SERPLBLD-SCNC: 104.7 MMOL/L (ref 98–110)
CHOLEST SERPL-MCNC: 188 MG/DL (ref 0–199)
CHOLEST/HDLC SERPL: 2 {RATIO} (ref 0–5)
CO2 SERPL-SCNC: 27.4 MMOL/L (ref 20–32)
CREAT SERPL-MCNC: 0.79 MG/DL (ref 0.6–1.3)
GLUCOSE SERPL-MCNC: 92 MG/DL (ref 60–99)
HDLC SERPL-MCNC: 92 MG/DL (ref 40–150)
LDLC SERPL CALC-MCNC: 82 MG/DL (ref 0–129)
POTASSIUM SERPL-SCNC: 4.11 MMOL/L (ref 3.5–5.3)
PROT SERPL-MCNC: 7 G/DL (ref 6.1–8.1)
SODIUM SERPL-SCNC: 138.4 MMOL/L (ref 135–146)
TRIGL SERPL-MCNC: 69 MG/DL (ref 0–149)

## 2025-05-27 PROCEDURE — G0439 PPPS, SUBSEQ VISIT: HCPCS | Performed by: FAMILY MEDICINE

## 2025-05-27 PROCEDURE — 80053 COMPREHEN METABOLIC PANEL: CPT | Performed by: FAMILY MEDICINE

## 2025-05-27 PROCEDURE — 80061 LIPID PANEL: CPT | Performed by: FAMILY MEDICINE

## 2025-05-27 PROCEDURE — 36415 COLL VENOUS BLD VENIPUNCTURE: CPT | Performed by: FAMILY MEDICINE

## 2025-05-27 PROCEDURE — 99213 OFFICE O/P EST LOW 20 MIN: CPT | Mod: 25 | Performed by: FAMILY MEDICINE

## 2025-05-27 PROCEDURE — G2211 COMPLEX E/M VISIT ADD ON: HCPCS | Performed by: FAMILY MEDICINE

## 2025-05-27 RX ORDER — ATORVASTATIN CALCIUM 10 MG/1
10 TABLET, FILM COATED ORAL DAILY
Qty: 90 TABLET | Refills: 1 | Status: SHIPPED | OUTPATIENT
Start: 2025-05-27

## 2025-05-27 NOTE — PROGRESS NOTES
Olga Hanley is a 74 year old female who presents for Medicare Annual Wellness Visit.    Current providers caring for this patient include:  Patient Care Team:  Melody Díaz MD as PCP - General (Family Medicine)  Melody Díaz MD as Assigned PCP    Complete Medical and Social history reviewed with patient, outlined below.    Patient Active Problem List   Diagnosis    ACP (advance care planning)    Age-related osteoporosis without current pathological fracture--followed by endocrinology    History of thyroid cancer--followed by endocrinology    Pure hypercholesterolemia    10 year risk of MI or stroke 7.5% or greater       Past Medical History:   Diagnosis Date    Cataract 2011     Utility update for deleted IMO code [employer] Update utility    Heart murmur 2003    Malignant neoplasm of thyroid gland (H)        Past Surgical History:   Procedure Laterality Date    CATARACT IOL, RT/LT  2011    left    CATARACT IOL, RT/LT Right 2019    THYROIDECTOMY Right 2016    Procedure: THYROIDECTOMY;  Surgeon: Prudence Broussard MD;  Location: [facility] OR    THYROIDECTOMY Left 2016    Procedure: THYROIDECTOMY;  Surgeon: Prudence Broussard MD;  Location: [facility] OR    [facility] APPENDECTOMY  1998    incidental with hysterectomy    [facility] NONSPECIFIC PROCEDURE  2003    cystocele, rectocele, repair Dr Escobar [facility] TOTAL ABDOM HYSTERECTOMY  1988    left oophorectomy; abnormal pap smears       Family History   Problem Relation Age of Onset    Hyperlipidemia Mother         diagnosed in her 70's    Hypertension Mother     Atrial fibrillation Mother     Other - See Comments Brother         Roman's  at 10    Cancer Maternal Aunt 70        breast cancer       Social History     Tobacco Use    Smoking status: Former     Types: Cigarettes     Passive exposure: Past    Smokeless tobacco: Never    Tobacco comments:     smoking in 20s only   Substance Use Topics    Alcohol use: Not Currently  "      Diet: regular, low salt/low fat  Physical Activity: patient exercises 3 times weekly, senior center regularly, cardio class  Depression Screen:    Over the past 2 weeks, patient has felt down, depressed, or hopeless:  No    Over the past 2 weeks, patient has felt little interest or pleasure in doing things: No    Functional ability/Safety screen:  Up and go test (able to get up and walk longer than 30 seconds): Passed  Patient needs assistance with: nothing  Patient's home has the following possible safety concerns: none identified  Patient has concerns about her hearing:  No  Cognitive Screen  Patient repeats three objects (ball, flag, tree)      Clock drawing test:   NORMAL  Recalls three objects after 3 minutes (ball,flag,tree):                                                                                               recalls 3 objects (3 points)    Physical Exam:  /72 (BP Location: Right arm, Patient Position: Sitting, Cuff Size: Adult Regular)   Pulse 80   Temp 98.7  F (37.1  C) (Temporal)   Ht 1.689 m (5' 6.5\")   Wt 64.9 kg (143 lb)   LMP  (LMP Unknown)   SpO2 97%   BMI 22.74 kg/m     Body mass index is 22.74 kg/m .        Health Maintenance   Topic Date Due    DEXA  12/01/2024    COVID-19 Vaccine (7 - 2024-25 season) 04/17/2025    MEDICARE ANNUAL WELLNESS VISIT  04/23/2025    LIPID  11/19/2025    FALL RISK ASSESSMENT  05/27/2026    MAMMO SCREENING  05/22/2027    ADVANCE CARE PLANNING  06/14/2027    DIABETES SCREENING  11/19/2027    DTAP/TDAP/TD IMMUNIZATION (3 - Td or Tdap) 10/15/2028    COLORECTAL CANCER SCREENING  08/24/2032    HEPATITIS C SCREENING  Completed    PHQ-2 (once per calendar year)  Completed    INFLUENZA VACCINE  Completed    Pneumococcal Vaccine: 50+ Years  Completed    ZOSTER IMMUNIZATION  Completed    RSV VACCINE  Completed    HPV IMMUNIZATION  Aged Out    MENINGITIS IMMUNIZATION  Aged Out    TSH W/FREE T4 REFLEX  Discontinued    LUNG CANCER SCREENING  Discontinued    "       End of Life Planning:   Patient currently has an advanced directive: Yes.  Practitioner is supportive of decision.    Education/Counseling:   Based on review of the above information, the following items were addressed:      Healthy diet and regular exercise.  Goes to senior center 3x/week, will start up again on her cardio classes    Appropriate preventive services were discussed with this patient, including applicable screening as appropriate for cardiovascular disease, diabetes, osteopenia/osteoporosis, and glaucoma.  As appropriate for age/gender, discussed screening for colorectal cancer, prostate cancer, breast cancer, and cervical cancer.   Checklist reviewing preventive services available has been given to the patient.    HM reviewed and is up to date.

## 2025-05-27 NOTE — NURSING NOTE
Chief Complaint   Patient presents with    Recheck Medication     Fasting today, refill medications     Pre-visit Screening:  Immunizations:  up to date  Colonoscopy:  is up to date  Mammogram: is up to date  Asthma Action Test/Plan:  NA  PHQ9:  NA  GAD7:  NA  Questioned patient about current smoking habits Pt. quit smoking some time ago.  Ok to leave detailed message on voice mail for today's visit only Yes, phone # [phone]

## 2025-05-27 NOTE — PATIENT INSTRUCTIONS
Health Maintenance   Topic Date Due    DEXA  12/01/2024    COVID-19 Vaccine (7 - 2024-25 season) 04/17/2025    MEDICARE ANNUAL WELLNESS VISIT  04/23/2025    LIPID  11/19/2025    FALL RISK ASSESSMENT  05/27/2026    MAMMO SCREENING  05/22/2027    ADVANCE CARE PLANNING  06/14/2027    DIABETES SCREENING  11/19/2027    DTAP/TDAP/TD IMMUNIZATION (3 - Td or Tdap) 10/15/2028    COLORECTAL CANCER SCREENING  08/24/2032    HEPATITIS C SCREENING  Completed    PHQ-2 (once per calendar year)  Completed    INFLUENZA VACCINE  Completed    Pneumococcal Vaccine: 50+ Years  Completed    ZOSTER IMMUNIZATION  Completed    RSV VACCINE  Completed    HPV IMMUNIZATION  Aged Out    MENINGITIS IMMUNIZATION  Aged Out    TSH W/FREE T4 REFLEX  Discontinued    LUNG CANCER SCREENING  Discontinued

## 2025-05-28 ENCOUNTER — RESULTS FOLLOW-UP (OUTPATIENT)
Dept: FAMILY MEDICINE | Facility: CLINIC | Age: 75
End: 2025-05-28

## 2025-06-26 ENCOUNTER — TRANSFERRED RECORDS (OUTPATIENT)
Dept: FAMILY MEDICINE | Facility: CLINIC | Age: 75
End: 2025-06-26

## 2025-08-06 ENCOUNTER — OFFICE VISIT (OUTPATIENT)
Dept: FAMILY MEDICINE | Facility: CLINIC | Age: 75
End: 2025-08-06

## 2025-08-06 VITALS
WEIGHT: 144 LBS | OXYGEN SATURATION: 99 % | DIASTOLIC BLOOD PRESSURE: 82 MMHG | SYSTOLIC BLOOD PRESSURE: 138 MMHG | TEMPERATURE: 97.9 F | HEART RATE: 74 BPM | BODY MASS INDEX: 22.89 KG/M2

## 2025-08-06 DIAGNOSIS — J32.0 CHRONIC MAXILLARY SINUSITIS: Primary | ICD-10-CM

## 2025-08-06 PROCEDURE — G2211 COMPLEX E/M VISIT ADD ON: HCPCS | Performed by: PHYSICIAN ASSISTANT

## 2025-08-06 PROCEDURE — 99213 OFFICE O/P EST LOW 20 MIN: CPT | Performed by: PHYSICIAN ASSISTANT

## 2025-08-12 DIAGNOSIS — J32.0 CHRONIC MAXILLARY SINUSITIS: ICD-10-CM
